# Patient Record
Sex: FEMALE | Race: WHITE | Employment: OTHER | URBAN - METROPOLITAN AREA
[De-identification: names, ages, dates, MRNs, and addresses within clinical notes are randomized per-mention and may not be internally consistent; named-entity substitution may affect disease eponyms.]

---

## 2019-03-01 ENCOUNTER — APPOINTMENT (OUTPATIENT)
Dept: GENERAL RADIOLOGY | Age: 84
End: 2019-03-01
Payer: MEDICARE

## 2019-03-01 ENCOUNTER — HOSPITAL ENCOUNTER (EMERGENCY)
Age: 84
Discharge: HOME | End: 2019-03-01
Payer: MEDICARE

## 2019-03-01 ENCOUNTER — APPOINTMENT (OUTPATIENT)
Dept: CT IMAGING | Age: 84
End: 2019-03-01
Payer: MEDICARE

## 2019-03-01 VITALS
HEART RATE: 92 BPM | SYSTOLIC BLOOD PRESSURE: 137 MMHG | BODY MASS INDEX: 25.13 KG/M2 | OXYGEN SATURATION: 95 % | DIASTOLIC BLOOD PRESSURE: 102 MMHG | WEIGHT: 128 LBS | TEMPERATURE: 97.8 F | RESPIRATION RATE: 16 BRPM | HEIGHT: 60 IN

## 2019-03-01 DIAGNOSIS — M54.50 ACUTE RIGHT-SIDED LOW BACK PAIN WITHOUT SCIATICA: ICD-10-CM

## 2019-03-01 DIAGNOSIS — S80.12XA CONTUSION OF LEFT LOWER LEG, INITIAL ENCOUNTER: ICD-10-CM

## 2019-03-01 DIAGNOSIS — S80.01XA CONTUSION OF RIGHT KNEE, INITIAL ENCOUNTER: ICD-10-CM
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
## 2019-03-01 PROCEDURE — 72131 CT LUMBAR SPINE W/O DYE: CPT

## 2019-03-01 PROCEDURE — 73564 X-RAY EXAM KNEE 4 OR MORE: CPT

## 2019-03-01 PROCEDURE — 99284 EMERGENCY DEPT VISIT MOD MDM: CPT

## 2019-03-01 PROCEDURE — 73590 X-RAY EXAM OF LOWER LEG: CPT

## 2019-03-01 PROCEDURE — 73560 X-RAY EXAM OF KNEE 1 OR 2: CPT

## 2019-03-01 RX ORDER — ATORVASTATIN CALCIUM 40 MG/1
40 TABLET, FILM COATED ORAL DAILY
COMMUNITY

## 2019-03-01 RX ORDER — PHENOL 1.4 %
1 AEROSOL, SPRAY (ML) MUCOUS MEMBRANE 2 TIMES DAILY
COMMUNITY

## 2019-03-01 RX ORDER — MAGNESIUM GLUCONATE 27 MG(500)
500 TABLET ORAL DAILY
COMMUNITY

## 2019-03-01 RX ORDER — ASPIRIN 81 MG/1
81 TABLET, CHEWABLE ORAL DAILY
COMMUNITY

## 2019-03-01 RX ORDER — OMEPRAZOLE AND SODIUM BICARBONATE 40; 1100 MG/1; MG/1
1 CAPSULE ORAL
Status: ON HOLD | COMMUNITY
End: 2020-05-08

## 2019-03-01 RX ORDER — ATENOLOL 50 MG/1
50 TABLET ORAL DAILY
COMMUNITY

## 2019-03-01 ASSESSMENT — PAIN SCALES - GENERAL: PAINLEVEL_OUTOF10: 5

## 2019-03-01 ASSESSMENT — ENCOUNTER SYMPTOMS
DIARRHEA: 0
SHORTNESS OF BREATH: 0
SORE THROAT: 0
NAUSEA: 0
VOMITING: 0
ABDOMINAL PAIN: 0
RHINORRHEA: 0
CONSTIPATION: 0
BLOOD IN STOOL: 0

## 2020-05-08 ENCOUNTER — APPOINTMENT (OUTPATIENT)
Dept: GENERAL RADIOLOGY | Age: 85
DRG: 291 | End: 2020-05-08
Payer: MEDICARE

## 2020-05-08 ENCOUNTER — HOSPITAL ENCOUNTER (INPATIENT)
Age: 85
LOS: 4 days | Discharge: HOME | DRG: 291 | End: 2020-05-12
Attending: EMERGENCY MEDICINE | Admitting: INTERNAL MEDICINE
Payer: MEDICARE

## 2020-05-08 PROBLEM — J96.00 ACUTE RESPIRATORY FAILURE (HCC): Status: ACTIVE | Noted: 2020-05-08

## 2020-05-08 LAB
ANION GAP SERPL CALCULATED.3IONS-SCNC: 14 MMOL/L (ref 3–16)
BACTERIA: ABNORMAL /HPF
BASE EXCESS VENOUS: 2.3 MMOL/L (ref -2–3)
BASOPHILS ABSOLUTE: 0.1 K/UL (ref 0–0.2)
BASOPHILS RELATIVE PERCENT: 0.9 %
BILIRUBIN URINE: NEGATIVE
BLOOD, URINE: ABNORMAL
BUN BLDV-MCNC: 11 MG/DL (ref 7–20)
CALCIUM SERPL-MCNC: 9.5 MG/DL (ref 8.3–10.6)
CARBOXYHEMOGLOBIN: 2.3 % (ref 0–1.5)
CHLORIDE BLD-SCNC: 95 MMOL/L (ref 99–110)
CLARITY: CLEAR
CO2: 24 MMOL/L (ref 21–32)
COLOR: YELLOW
CREAT SERPL-MCNC: 0.8 MG/DL (ref 0.6–1.2)
EKG ATRIAL RATE: 98 BPM
EKG DIAGNOSIS: NORMAL
EKG Q-T INTERVAL: 324 MS
EKG QRS DURATION: 88 MS
EKG QTC CALCULATION (BAZETT): 411 MS
EKG R AXIS: 52 DEGREES
EKG T AXIS: 32 DEGREES
EKG VENTRICULAR RATE: 97 BPM
EOSINOPHILS ABSOLUTE: 0 K/UL (ref 0–0.6)
EOSINOPHILS RELATIVE PERCENT: 0.2 %
GFR AFRICAN AMERICAN: >60
GFR NON-AFRICAN AMERICAN: >60
GLUCOSE BLD-MCNC: 111 MG/DL (ref 70–99)
GLUCOSE URINE: 500 MG/DL
HCO3 VENOUS: 26 MMOL/L (ref 24–28)
HCT VFR BLD CALC: 40.4 % (ref 36–48)
HEMOGLOBIN, VEN, REDUCED: 6.5 %
HEMOGLOBIN: 13.3 G/DL (ref 12–16)
KETONES, URINE: NEGATIVE MG/DL
LEUKOCYTE ESTERASE, URINE: ABNORMAL
LYMPHOCYTES ABSOLUTE: 1.6 K/UL (ref 1–5.1)
LYMPHOCYTES RELATIVE PERCENT: 13.1 %
MCH RBC QN AUTO: 29 PG (ref 26–34)
MCHC RBC AUTO-ENTMCNC: 33 G/DL (ref 31–36)
MCV RBC AUTO: 87.8 FL (ref 80–100)
METHEMOGLOBIN VENOUS: 0.6 % (ref 0–1.5)
MICROSCOPIC EXAMINATION: YES
MONOCYTES ABSOLUTE: 1.2 K/UL (ref 0–1.3)
MONOCYTES RELATIVE PERCENT: 9.8 %
NEUTROPHILS ABSOLUTE: 9.2 K/UL (ref 1.7–7.7)
NEUTROPHILS RELATIVE PERCENT: 76 %
NITRITE, URINE: NEGATIVE
O2 SAT, VEN: 93 %
PCO2, VEN: 38.9 MMHG (ref 41–51)
PDW BLD-RTO: 13.9 % (ref 12.4–15.4)
PH UA: 6 (ref 5–8)
PH VENOUS: 7.44 (ref 7.35–7.45)
PLATELET # BLD: 250 K/UL (ref 135–450)
PMV BLD AUTO: 7 FL (ref 5–10.5)
PO2, VEN: 72.5 MMHG (ref 25–40)
POTASSIUM REFLEX MAGNESIUM: 4.2 MMOL/L (ref 3.5–5.1)
PRO-BNP: 2210 PG/ML (ref 0–449)
PROCALCITONIN: 0.04 NG/ML (ref 0–0.15)
PROTEIN UA: NEGATIVE MG/DL
RBC # BLD: 4.61 M/UL (ref 4–5.2)
RBC UA: ABNORMAL /HPF (ref 0–4)
SODIUM BLD-SCNC: 133 MMOL/L (ref 136–145)
SPECIFIC GRAVITY UA: 1.01 (ref 1–1.03)
TCO2 CALC VENOUS: 27 MMOL/L
TROPONIN: <0.01 NG/ML
TROPONIN: <0.01 NG/ML
TSH REFLEX: 2.8 UIU/ML (ref 0.27–4.2)
URINE REFLEX TO CULTURE: YES
URINE TYPE: ABNORMAL
UROBILINOGEN, URINE: 0.2 E.U./DL
WBC # BLD: 12.1 K/UL (ref 4–11)
WBC UA: ABNORMAL /HPF (ref 0–5)

## 2020-05-08 PROCEDURE — 84145 PROCALCITONIN (PCT): CPT

## 2020-05-08 PROCEDURE — 2060000000 HC ICU INTERMEDIATE R&B

## 2020-05-08 PROCEDURE — 2700000000 HC OXYGEN THERAPY PER DAY

## 2020-05-08 PROCEDURE — 94640 AIRWAY INHALATION TREATMENT: CPT

## 2020-05-08 PROCEDURE — 87077 CULTURE AEROBIC IDENTIFY: CPT

## 2020-05-08 PROCEDURE — 94761 N-INVAS EAR/PLS OXIMETRY MLT: CPT

## 2020-05-08 PROCEDURE — 93005 ELECTROCARDIOGRAM TRACING: CPT | Performed by: EMERGENCY MEDICINE

## 2020-05-08 PROCEDURE — 81001 URINALYSIS AUTO W/SCOPE: CPT

## 2020-05-08 PROCEDURE — 87040 BLOOD CULTURE FOR BACTERIA: CPT

## 2020-05-08 PROCEDURE — 36415 COLL VENOUS BLD VENIPUNCTURE: CPT

## 2020-05-08 PROCEDURE — 87086 URINE CULTURE/COLONY COUNT: CPT

## 2020-05-08 PROCEDURE — U0003 INFECTIOUS AGENT DETECTION BY NUCLEIC ACID (DNA OR RNA); SEVERE ACUTE RESPIRATORY SYNDROME CORONAVIRUS 2 (SARS-COV-2) (CORONAVIRUS DISEASE [COVID-19]), AMPLIFIED PROBE TECHNIQUE, MAKING USE OF HIGH THROUGHPUT TECHNOLOGIES AS DESCRIBED BY CMS-2020-01-R: HCPCS

## 2020-05-08 PROCEDURE — 6370000000 HC RX 637 (ALT 250 FOR IP): Performed by: INTERNAL MEDICINE

## 2020-05-08 PROCEDURE — 84484 ASSAY OF TROPONIN QUANT: CPT

## 2020-05-08 PROCEDURE — 99285 EMERGENCY DEPT VISIT HI MDM: CPT

## 2020-05-08 PROCEDURE — 85025 COMPLETE CBC W/AUTO DIFF WBC: CPT

## 2020-05-08 PROCEDURE — 6360000002 HC RX W HCPCS: Performed by: EMERGENCY MEDICINE

## 2020-05-08 PROCEDURE — 84443 ASSAY THYROID STIM HORMONE: CPT

## 2020-05-08 PROCEDURE — 99223 1ST HOSP IP/OBS HIGH 75: CPT | Performed by: INTERNAL MEDICINE

## 2020-05-08 PROCEDURE — 80048 BASIC METABOLIC PNL TOTAL CA: CPT

## 2020-05-08 PROCEDURE — 6360000002 HC RX W HCPCS: Performed by: INTERNAL MEDICINE

## 2020-05-08 PROCEDURE — 87186 SC STD MICRODIL/AGAR DIL: CPT

## 2020-05-08 PROCEDURE — 96374 THER/PROPH/DIAG INJ IV PUSH: CPT

## 2020-05-08 PROCEDURE — 83880 ASSAY OF NATRIURETIC PEPTIDE: CPT

## 2020-05-08 PROCEDURE — 6370000000 HC RX 637 (ALT 250 FOR IP): Performed by: EMERGENCY MEDICINE

## 2020-05-08 PROCEDURE — 82803 BLOOD GASES ANY COMBINATION: CPT

## 2020-05-08 PROCEDURE — 2580000003 HC RX 258: Performed by: INTERNAL MEDICINE

## 2020-05-08 PROCEDURE — 87150 DNA/RNA AMPLIFIED PROBE: CPT

## 2020-05-08 PROCEDURE — 71045 X-RAY EXAM CHEST 1 VIEW: CPT

## 2020-05-08 RX ORDER — LOSARTAN POTASSIUM 100 MG/1
100 TABLET ORAL DAILY
COMMUNITY

## 2020-05-08 RX ORDER — METHYLPREDNISOLONE SODIUM SUCCINATE 125 MG/2ML
125 INJECTION, POWDER, LYOPHILIZED, FOR SOLUTION INTRAMUSCULAR; INTRAVENOUS ONCE
Status: COMPLETED | OUTPATIENT
Start: 2020-05-08 | End: 2020-05-08

## 2020-05-08 RX ORDER — IPRATROPIUM BROMIDE AND ALBUTEROL SULFATE 2.5; .5 MG/3ML; MG/3ML
1 SOLUTION RESPIRATORY (INHALATION)
Status: DISCONTINUED | OUTPATIENT
Start: 2020-05-08 | End: 2020-05-08

## 2020-05-08 RX ORDER — ACETAMINOPHEN 650 MG/1
650 SUPPOSITORY RECTAL EVERY 6 HOURS PRN
Status: DISCONTINUED | OUTPATIENT
Start: 2020-05-08 | End: 2020-05-12 | Stop reason: HOSPADM

## 2020-05-08 RX ORDER — CEFDINIR 300 MG/1
300 CAPSULE ORAL EVERY 12 HOURS SCHEDULED
Status: DISCONTINUED | OUTPATIENT
Start: 2020-05-08 | End: 2020-05-10

## 2020-05-08 RX ORDER — GUAIFENESIN 600 MG/1
600 TABLET, EXTENDED RELEASE ORAL 2 TIMES DAILY
COMMUNITY

## 2020-05-08 RX ORDER — BUDESONIDE AND FORMOTEROL FUMARATE DIHYDRATE 160; 4.5 UG/1; UG/1
2 AEROSOL RESPIRATORY (INHALATION) 2 TIMES DAILY
Status: DISCONTINUED | OUTPATIENT
Start: 2020-05-09 | End: 2020-05-12 | Stop reason: HOSPADM

## 2020-05-08 RX ORDER — ATORVASTATIN CALCIUM 40 MG/1
40 TABLET, FILM COATED ORAL DAILY
Status: DISCONTINUED | OUTPATIENT
Start: 2020-05-08 | End: 2020-05-12 | Stop reason: HOSPADM

## 2020-05-08 RX ORDER — POLYETHYLENE GLYCOL 3350 17 G/17G
17 POWDER, FOR SOLUTION ORAL DAILY PRN
Status: DISCONTINUED | OUTPATIENT
Start: 2020-05-08 | End: 2020-05-12 | Stop reason: HOSPADM

## 2020-05-08 RX ORDER — PREDNISONE 20 MG/1
40 TABLET ORAL DAILY
Status: DISCONTINUED | OUTPATIENT
Start: 2020-05-08 | End: 2020-05-12 | Stop reason: HOSPADM

## 2020-05-08 RX ORDER — ONDANSETRON 2 MG/ML
4 INJECTION INTRAMUSCULAR; INTRAVENOUS EVERY 6 HOURS PRN
Status: DISCONTINUED | OUTPATIENT
Start: 2020-05-08 | End: 2020-05-12 | Stop reason: HOSPADM

## 2020-05-08 RX ORDER — SODIUM CHLORIDE 0.9 % (FLUSH) 0.9 %
10 SYRINGE (ML) INJECTION PRN
Status: DISCONTINUED | OUTPATIENT
Start: 2020-05-08 | End: 2020-05-12 | Stop reason: HOSPADM

## 2020-05-08 RX ORDER — OMEPRAZOLE 20 MG/1
20 CAPSULE, DELAYED RELEASE ORAL DAILY
COMMUNITY

## 2020-05-08 RX ORDER — IPRATROPIUM BROMIDE AND ALBUTEROL SULFATE 2.5; .5 MG/3ML; MG/3ML
1 SOLUTION RESPIRATORY (INHALATION) ONCE
Status: COMPLETED | OUTPATIENT
Start: 2020-05-08 | End: 2020-05-08

## 2020-05-08 RX ORDER — ASPIRIN 81 MG/1
81 TABLET, CHEWABLE ORAL DAILY
Status: DISCONTINUED | OUTPATIENT
Start: 2020-05-08 | End: 2020-05-12 | Stop reason: HOSPADM

## 2020-05-08 RX ORDER — ATENOLOL 50 MG/1
50 TABLET ORAL DAILY
Status: DISCONTINUED | OUTPATIENT
Start: 2020-05-08 | End: 2020-05-12 | Stop reason: HOSPADM

## 2020-05-08 RX ORDER — FUROSEMIDE 10 MG/ML
40 INJECTION INTRAMUSCULAR; INTRAVENOUS DAILY
Status: DISCONTINUED | OUTPATIENT
Start: 2020-05-08 | End: 2020-05-09

## 2020-05-08 RX ORDER — AMLODIPINE BESYLATE 5 MG/1
5 TABLET ORAL DAILY
COMMUNITY

## 2020-05-08 RX ORDER — LOSARTAN POTASSIUM 100 MG/1
100 TABLET ORAL DAILY
Status: DISCONTINUED | OUTPATIENT
Start: 2020-05-08 | End: 2020-05-12 | Stop reason: HOSPADM

## 2020-05-08 RX ORDER — GUAIFENESIN 600 MG/1
600 TABLET, EXTENDED RELEASE ORAL 2 TIMES DAILY
Status: DISCONTINUED | OUTPATIENT
Start: 2020-05-08 | End: 2020-05-12 | Stop reason: HOSPADM

## 2020-05-08 RX ORDER — PANTOPRAZOLE SODIUM 40 MG/1
40 TABLET, DELAYED RELEASE ORAL
Status: DISCONTINUED | OUTPATIENT
Start: 2020-05-09 | End: 2020-05-12 | Stop reason: HOSPADM

## 2020-05-08 RX ORDER — ALBUTEROL SULFATE 2.5 MG/3ML
2.5 SOLUTION RESPIRATORY (INHALATION) ONCE
Status: COMPLETED | OUTPATIENT
Start: 2020-05-08 | End: 2020-05-08

## 2020-05-08 RX ORDER — PROMETHAZINE HYDROCHLORIDE 25 MG/1
12.5 TABLET ORAL EVERY 6 HOURS PRN
Status: DISCONTINUED | OUTPATIENT
Start: 2020-05-08 | End: 2020-05-12 | Stop reason: HOSPADM

## 2020-05-08 RX ORDER — CEFDINIR 300 MG/1
300 CAPSULE ORAL 2 TIMES DAILY
COMMUNITY
Start: 2020-05-07 | End: 2020-05-14

## 2020-05-08 RX ORDER — ALBUTEROL SULFATE 90 UG/1
2 AEROSOL, METERED RESPIRATORY (INHALATION) 4 TIMES DAILY
Status: DISCONTINUED | OUTPATIENT
Start: 2020-05-08 | End: 2020-05-12 | Stop reason: HOSPADM

## 2020-05-08 RX ORDER — SODIUM CHLORIDE 0.9 % (FLUSH) 0.9 %
10 SYRINGE (ML) INJECTION EVERY 12 HOURS SCHEDULED
Status: DISCONTINUED | OUTPATIENT
Start: 2020-05-08 | End: 2020-05-12 | Stop reason: HOSPADM

## 2020-05-08 RX ORDER — ACETAMINOPHEN 325 MG/1
650 TABLET ORAL EVERY 6 HOURS PRN
Status: DISCONTINUED | OUTPATIENT
Start: 2020-05-08 | End: 2020-05-12 | Stop reason: HOSPADM

## 2020-05-08 RX ORDER — LORATADINE 10 MG/1
10 TABLET ORAL DAILY
COMMUNITY

## 2020-05-08 RX ORDER — AMLODIPINE BESYLATE 5 MG/1
5 TABLET ORAL DAILY
Status: DISCONTINUED | OUTPATIENT
Start: 2020-05-08 | End: 2020-05-10

## 2020-05-08 RX ADMIN — AMLODIPINE BESYLATE 5 MG: 5 TABLET ORAL at 15:42

## 2020-05-08 RX ADMIN — METHYLPREDNISOLONE SODIUM SUCCINATE 125 MG: 125 INJECTION, POWDER, FOR SOLUTION INTRAMUSCULAR; INTRAVENOUS at 06:39

## 2020-05-08 RX ADMIN — ASPIRIN 81 MG 81 MG: 81 TABLET ORAL at 13:03

## 2020-05-08 RX ADMIN — FUROSEMIDE 40 MG: 10 INJECTION, SOLUTION INTRAMUSCULAR; INTRAVENOUS at 13:06

## 2020-05-08 RX ADMIN — Medication 10 ML: at 22:13

## 2020-05-08 RX ADMIN — PREDNISONE 40 MG: 20 TABLET ORAL at 13:03

## 2020-05-08 RX ADMIN — CEFDINIR 300 MG: 300 CAPSULE ORAL at 22:12

## 2020-05-08 RX ADMIN — ALBUTEROL SULFATE 2.5 MG: 2.5 SOLUTION RESPIRATORY (INHALATION) at 06:50

## 2020-05-08 RX ADMIN — ENOXAPARIN SODIUM 40 MG: 40 INJECTION SUBCUTANEOUS at 13:06

## 2020-05-08 RX ADMIN — ALBUTEROL SULFATE 2 PUFF: 108 AEROSOL, METERED RESPIRATORY (INHALATION) at 20:32

## 2020-05-08 RX ADMIN — BUDESONIDE AND FORMOTEROL FUMARATE DIHYDRATE 2 PUFF: 160; 4.5 AEROSOL RESPIRATORY (INHALATION) at 20:33

## 2020-05-08 RX ADMIN — Medication 10 ML: at 13:09

## 2020-05-08 RX ADMIN — LOSARTAN POTASSIUM 100 MG: 100 TABLET, FILM COATED ORAL at 13:03

## 2020-05-08 RX ADMIN — ATORVASTATIN CALCIUM 40 MG: 40 TABLET, FILM COATED ORAL at 15:42

## 2020-05-08 RX ADMIN — GUAIFENESIN 600 MG: 600 TABLET, EXTENDED RELEASE ORAL at 22:12

## 2020-05-08 RX ADMIN — ALBUTEROL SULFATE 2 PUFF: 108 AEROSOL, METERED RESPIRATORY (INHALATION) at 15:44

## 2020-05-08 RX ADMIN — IPRATROPIUM BROMIDE AND ALBUTEROL SULFATE 1 AMPULE: .5; 3 SOLUTION RESPIRATORY (INHALATION) at 06:44

## 2020-05-08 RX ADMIN — ATENOLOL 50 MG: 50 TABLET ORAL at 13:03

## 2020-05-08 RX ADMIN — CEFDINIR 300 MG: 300 CAPSULE ORAL at 13:25

## 2020-05-08 ASSESSMENT — PAIN SCALES - GENERAL
PAINLEVEL_OUTOF10: 0
PAINLEVEL_OUTOF10: 0

## 2020-05-08 NOTE — PROGRESS NOTES
RESPIRATORY THERAPY ASSESSMENT    Name:  Mildred Faust Record Number:  [id number]  Age: 80 y.o. Gender: female  : 8/15/1933  Today's Date:  2020  Room:  [facility]4313-01    Assessment     Is the patient being admitted for a COPD or Asthma exacerbation? No   (If yes the patient will be seen every 4 hours for the first 24 hours and then reassessed)    Patient Admission Diagnosis:\" SOB and COVID r/o      Allergies  Allergies   Allergen Reactions    Sulfamethoxazole-Trimethoprim Hives    Albuterol Sulfate      Tachy     Alendronate Hives    Codeine      Feels dizzy     Fluticasone     Morphine And Related     Nitrofurantoin      Can not have due to pulmonary fibrosis     Promethazine Other (See Comments)     Legs swelled    Sodium Chloride Swelling     inhalation only    Sulfa Antibiotics Hives    Vitamin D Analogs Rash       Pulmonary History:COPD  Home Oxygen Therapy:  room air  Home Respiratory Therapy:Albuterol and Vilanterol/Fluticasone Furoate   Current Respiratory Therapy:  Message sent to pharmacy to convert to COVID appropriate inhalers for home regmin  Treatment Type: HHN  Medications: Albuterol    Respiratory Severity Index(RSI)   Patients with orders for inhalation medications, oxygen, or any therapeutic treatment modality will be placed on Respiratory Protocol. They will be assessed with the first treatment and at least every 72 hours thereafter. The following severity scale will be used to determine frequency of treatment intervention. Smoking History: No Smoking History = 0    Social History  Social History     Tobacco Use    Smoking status: Never Smoker    Smokeless tobacco: Never Used   Substance Use Topics    Alcohol use: No    Drug use: No       Recent Surgical History: None = 0  Past Surgical History  History reviewed. No pertinent surgical history.     Level of Consciousness: Alert, Oriented, and Cooperative = 0    Level of Activity: Walking unassisted = 0    Respiratory Pattern: Regular Pattern; RR 8-20 = 0    Breath Sounds: Diminshed bilaterally and/or crackles = 2    Sputum   ,  ,    Cough: Strong, spontaneous, non-productive = 0    Vital Signs   /77   Pulse 102   Temp 99 °F (37.2 °C) (Oral)   Resp 20   Ht 5' (1.524 m)   Wt 129 lb 13.6 oz (58.9 kg)   SpO2 93%   BMI 25.36 kg/m²   SPO2 (COPD values may differ): 88-89% on room air or greater than 92% on FiO2 28- 35% = 2    Peak Flow (asthma only): not applicable = 0    RSI: 0-4 = See once and convert to home regimen or discontinue        Plan       Goals: Medication delivery    Patient/caregiver was educated on the proper method of use for Respiratory Care Devices:  Yes      Level of patient/caregiver understanding able to:   x Verbalize understanding   ? Demonstrate understanding       ? Teach back        ? Needs reinforcement       ? No available caregiver               ? Other:     Response to education:  Excellent     Is patient being placed on Home Treatment Regimen? Yes     Does the patient have everything they need prior to discharge? Yes     Comments: Chart reviewed    Plan of Care: Home regimen conversion. Electronically signed by Sury Saenz RCP on 5/8/2020 at 1:06 PM    Respiratory Protocol Guidelines     1. Assessment and treatment by Respiratory Therapy will be initiated for medication and therapeutic interventions upon initiation of aerosolized medication. 2. Physician will be contacted for respiratory rate (RR) greater than 35 breaths per minute. Therapy will be held for heart rate (HR) greater than 140 beats per minute, pending direction from physician. 3. Bronchodilators will be administered via Metered Dose Inhaler (MDI) with spacer when the following criteria are met:  a. Alert and cooperative     b. HR < 140 bpm  c. RR < 30 bpm                d. Can demonstrate a 2-3 second inspiratory hold  4.  Bronchodilators will be administered via Hand Held Nebulizer [facility]) to patients when ANY of the following criteria are met  a. Incognizant or uncooperative          b. Patients treated with HHN at Home        c. Unable to demonstrate proper use of MDI with spacer     d. RR > 30 bpm   5. Bronchodilators will be delivered via Metered Dose Inhaler (MDI), HHN, Aerogen to intubated patients on mechanical ventilation. 6. Inhalation medication orders will be delivered and/or substituted as outlined below. Aerosolized Medications Ordering and Administration Guidelines:    1. All Medications will be ordered by a physician, and their frequency and/or modality will be adjusted as defined by the patients Respiratory Severity Index (RSI) score. 2. If the patient does not have documented COPD, consider discontinuing anticholinergics when RSI is less than 9.  3. If the bronchospasm worsens (increased RSI), then the bronchodilator frequency can be increased to a maximum of every 4 hours. If greater than every 4 hours is required, the physician will be contacted. 4. If the bronchospasm improves, the frequency of the bronchodilator can be decreased, based on the patient's RSI, but not less than home treatment regimen frequency. 5. Bronchodilator(s) will be discontinued if patient has a RSI less than 9 and has received no scheduled or as needed treatment for 72  Hrs. Patients Ordered on a Mucolytic Agent:    1. Must always be administered with a bronchodilator. 2. Discontinue if patient experiences worsened bronchospasm, or secretions have lessened to the point that the patient is able to clear them with a cough. Anti-inflammatory and Combination Medications:    1. If the patient lacks prior history of lung disease, is not using inhaled anti-inflammatory medication at home, and lacks wheezing by examination or by history for at least 24 hours, contact physician for possible discontinuation.

## 2020-05-08 NOTE — PROGRESS NOTES
Attempted to call point person James, no answer on either of the phone numbers listed. Will pass to night shift.  Patient is in contact with multiple family members

## 2020-05-08 NOTE — ED PROVIDER NOTES
[address] [facility] ENCOUNTER          ATTENDING PHYSICIAN NOTE       Date of evaluation: 5/8/2020    ADDENDUM:      Care of this patient was assumed from Dr. Reesa Claude. The patient was seen for Shortness of Breath  . The patient's initial evaluation and plan have been discussed with the prior provider who initially evaluated the patient. Nursing Notes, Past Medical Hx, Past Surgical Hx, Social Hx, Allergies, and Family Hx were all reviewed. Diagnostic Results     EKG   Reviewed, demonstrates atrial fibrillation with ventricular rate of 97 bpm.  No acute ischemic changes. RADIOLOGY:  XR CHEST PORTABLE   Final Result   Impression: Probable mild interstitial pulmonary edema.           LABS:   Results for orders placed or performed during the hospital encounter of 05/08/20   CBC Auto Differential   Result Value Ref Range    WBC 12.1 (H) 4.0 - 11.0 K/uL    RBC 4.61 4.00 - 5.20 M/uL    Hemoglobin 13.3 12.0 - 16.0 g/dL    Hematocrit 40.4 36.0 - 48.0 %    MCV 87.8 80.0 - 100.0 fL    MCH 29.0 26.0 - 34.0 pg    MCHC 33.0 31.0 - 36.0 g/dL    RDW 13.9 12.4 - 15.4 %    Platelets 946 516 - 179 K/uL    MPV 7.0 5.0 - 10.5 fL    Neutrophils % 76.0 %    Lymphocytes % 13.1 %    Monocytes % 9.8 %    Eosinophils % 0.2 %    Basophils % 0.9 %    Neutrophils Absolute 9.2 (H) 1.7 - 7.7 K/uL    Lymphocytes Absolute 1.6 1.0 - 5.1 K/uL    Monocytes Absolute 1.2 0.0 - 1.3 K/uL    Eosinophils Absolute 0.0 0.0 - 0.6 K/uL    Basophils Absolute 0.1 0.0 - 0.2 K/uL   Basic Metabolic Panel w/ Reflex to MG   Result Value Ref Range    Sodium 133 (L) 136 - 145 mmol/L    Potassium reflex Magnesium 4.2 3.5 - 5.1 mmol/L    Chloride 95 (L) 99 - 110 mmol/L    CO2 24 21 - 32 mmol/L    Anion Gap 14 3 - 16    Glucose 111 (H) 70 - 99 mg/dL    BUN 11 7 - 20 mg/dL    CREATININE 0.8 0.6 - 1.2 mg/dL    GFR Non-African American >60 >60    GFR African American >60 >60    Calcium 9.5 8.3 - 10.6 mg/dL   Troponin   Result Value Ref Range

## 2020-05-08 NOTE — CARE COORDINATION
Case Management Assessment           Initial Evaluation                Date / Time of Evaluation: 5/8/2020 3:56 PM                 Assessment Completed by: Maira Merino    Patient Name: Terry Morgan     YOB: 1933  Diagnosis: Acute respiratory failure ([facility] [address] [J96.00]  Acute respiratory failure ([facility] [address] [J96.00]     Date / Time: 5/8/2020  6:18 AM    Patient Admission Status: Inpatient    If patient is discharged prior to next notation, then this note serves as note for discharge by case management. Current PCP: [address] Patient: No    Chart Reviewed: Yes  Patient/ Family Interviewed: Yes    Initial assessment completed at bedside with: pt over the phone    Hospitalization in the last 30 days: No    Emergency Contacts:  Extended Emergency Contact Information  Primary Emergency Contact: Debora Balderas Phone: [phone]  Work Phone: [phone]  Relation: Child    Advance Directives:   Code Status: Full Code      Financial  Payor: Jonh Favor / Plan: [place] / Product Type: *No Product type* /     Pre-cert required for SNF: Yes    Pharmacy  No Pharmacies Listed    Potential assistance Purchasing Medications:    Does Patient want to participate in local refill/ meds to beds program?:      Meds To Beds General Rules:  1. Can ONLY be done Monday- Friday between 8:30am-5pm  2. Prescription(s) must be in pharmacy by 3pm to be filled same day  3. Copy of patient's insurance/ prescription drug card and patient face sheet must be sent along with the prescription(s)  4. Cost of Rx cannot be added to hospital bill. If financial assistance is needed, please contact unit  or ;  or  CANNOT provide pharmacy voucher for patients co-pays  5.  Patients can then  the prescription on their way out of the hospital at discharge, or pharmacy can deliver to the bedside if staff is available. (payment due at time of pick-up or delivery - cash, check, or card accepted)     Able to afford home medications/ co-pay costs: Yes    ADLS  Support Systems:      PT AM-PAC:   /24  OT AM-PAC:   /24    [place]: from home with son  Steps:     Plans to RETURN to current housing: Yes  Barriers to RETURNING to current housing: none    Home Care Information  Currently ACTIVE with [address]: No  Home Care Agency: Not Applicable          Durable Medical Equipment  DME Provider: n/a  Equipment: n/a    Home Oxygen and Respiratory Equipment  Has HOME OXYGEN prior to admission: No  [address]: Not Applicable  Other Respiratory Equipment: nebulizer        Dialysis  Active with HD/PD prior to admission: No  Nephrologist:     HD Center:  Not Applicable    DISCHARGE PLAN:  Disposition: Home- No Services Needed    Transportation PLAN for discharge: family     Factors facilitating achievement of predicted outcomes: Family support    Barriers to discharge: Medical complications    Additional Case Management Notes:   CM spoke with patient over the phone. Patient is from home with her son, independent pta, no DME needs, does own shopping, cleaning and ADL's. Patient denies any needs for HHC at this time. Family available to transport home at discharge. The Plan for Transition of Care is related to the following treatment goals of Acute respiratory failure ([address] [J96.00]  Acute respiratory failure ([address] [J96.00]    The Patient and/or patient representative Claudette and her family were provided with a choice of provider and agrees with the discharge plan Yes    Freedom of choice list was provided with basic dialogue that supports the patient's individualized plan of care/goals and shares the quality data associated with the providers.  Yes    Care Transition patient: No    Britany Moore RN  [facility]  Case Management Department  Ph: [phone]   Fax: [phone]

## 2020-05-08 NOTE — ED NOTES
Covid swab collected/double bagged/walked to lab by [facility], [facility]/per protocol.       Johnny Valiente RN  05/08/20 [id number]

## 2020-05-08 NOTE — ED PROVIDER NOTES
[address] [facility] [address] ENCOUNTER          ATTENDING PHYSICIAN NOTE       Date of evaluation: 5/8/2020    Chief Complaint     Shortness of Breath      History of Present Illness     Claudette Dona Brunt is a 80 y.o. female the past medical history of COPD history of pneumonia in the past who presents with shortness of breath over the past 2 to 3 weeks but then progressively shortness of breath over the past 3 days. Has been using DuoNeb inhaler, was on a prednisone burst was on cefdinir for UTI as well. Has been speaking with pulmonology pulmonologist but then things got worse last night so she presents here. EMS state that she was having oxygen saturations in the mid 80s on their arrival on room air they placed her on oxygen it went up to the mid 90s. Here she endorses shortness of breath, feels like she has to cough something up but is speaking in full sentences. She does endorse fever with temperatures at home. Review of Systems     Review of Systems  A full 10 point review of systems was obtained. Pertinent positives and negatives as documented in the HPI, otherwise all other systems were reviewed and were negative. Past Medical, Surgical, Family, and Social History     She has a past medical history of COPD (chronic obstructive pulmonary disease) ([facility] [address] and PNA (pneumonia). She has no past surgical history on file. Her family history is not on file. She reports that she has never smoked. She has never used smokeless tobacco. She reports that she does not drink alcohol or use drugs.     Medications     Previous Medications    AMLODIPINE-ATORVASTATIN PO    Take by mouth    ASPIRIN 81 MG CHEWABLE TABLET    Take 81 mg by mouth daily    ATENOLOL (TENORMIN) 50 MG TABLET    Take 50 mg by mouth daily    ATORVASTATIN (LIPITOR) 40 MG TABLET    Take 40 mg by mouth daily    CALCIUM CARBONATE 600 MG TABS TABLET    Take 1 tablet by mouth daily as needed    FEXOFENADINE HCL (MUCINEX ALLERGY PO)    Take by mouth    FLUTICASONE FUROATE-VILANTEROL (BREO ELLIPTA IN)    Inhale into the lungs    MAGNESIUM GLUCONATE (MAGONATE) 500 MG TABLET    Take 500 mg by mouth 2 times daily    OMEPRAZOLE-SODIUM BICARBONATE (OMEPPI)  MG PER CAPSULE    Take 1 capsule by mouth every morning (before breakfast)       Allergies     She is allergic to sulfamethoxazole-trimethoprim; albuterol sulfate; alendronate; codeine; fluticasone; morphine and related; nitrofurantoin; promethazine; sodium chloride; sulfa antibiotics; and vitamin d analogs. Physical Exam     INITIAL VITALS: BP: (!) 140/80, Temp: 99.1 °F (37.3 °C), Pulse: 100, Resp: 20, SpO2: 100 %   Physical Exam  General: Well appearing in NAD  HEENT:  head is atraumatic, sclera are clear, oropharynx is nonerythematous, mucus membranes are moist  Neck: Trachea midline  Chest: Nonlabored respirations, decreased breath sounds diffusely  Cardiovascular: Regular rate and rhythm, 2+ radial pulses bilaterally  Abdominal: Nondistended abdomen, soft, nontender without rebound or gaurding  Skin: Warm, dry well perfused, no rashes  Extremities: no obvious deformities, no tenderness to palpation diffusely  Neurologic:  Alert and oriented, speech is clear and intact without dysarthria, gait is intact  Psychologic: appropriate mood and affect  Diagnostic Results     EKG   Ventricular rate 97, atrial fibrillation with no significant ST or T wave changes and no signs of ischemia    RADIOLOGY:  XR CHEST PORTABLE    (Results Pending)       LABS:   Results for orders placed or performed during the hospital encounter of 05/08/20   EKG 12 Lead   Result Value Ref Range    Ventricular Rate 97 BPM    Atrial Rate 98 BPM    QRS Duration 88 ms    Q-T Interval 324 ms    QTc Calculation (Bazett) 411 ms    R Axis 52 degrees    T Axis 32 degrees    Diagnosis       EKG performed in ER and to be interpreted by ER physician. Confirmed by MD, ER ([id number]),  Christophe Quintero ([id number]) on 5/8/2020 6:31:11 AM       ED BEDSIDE ULTRASOUND:      RECENT VITALS:  BP: (!) 140/80,Temp: 99.1 °F (37.3 °C), Pulse: 100, Resp: 20, SpO2: 100 %     Procedures       ED Course     Nursing Notes, Past Medical Hx, Past Surgical Hx, Social Hx,Allergies, and Family Hx were reviewed. patient was given the following medications:  Orders Placed This Encounter   Medications    methylPREDNISolone sodium (SOLU-MEDROL) injection 125 mg    ipratropium-albuterol (DUONEB) nebulizer solution 1 ampule    albuterol (PROVENTIL) nebulizer solution 2.5 mg    albuterol (PROVENTIL) nebulizer solution 2.5 mg       CONSULTS:  None    MEDICAL DECISIONMAKING / ASSESSMENT / Jayne Delores is a 80 y.o. female presents today with shortness of breath requiring 3 L of oxygen with diffuse diminished breath sounds throughout all lung fields. She is speaking in full sentences. No overt signs of fluid overload. EKG shows appears to be atrial fibrillation. Concern for CHF versus COPD exacerbation. And decreased breath sounds will start with albuterol inhalers and Solu-Medrol. At the same time will obtain CBC, basic metabolic panel, troponin, bnp and CXR. At this point patient is stable and will be signed out to oncoming colleague.       Neelima Alva MD  05/08/20 1975

## 2020-05-08 NOTE — H&P
HOSPITALISTS HISTORY AND PHYSICAL    5/8/2020 12:08 PM    Patient Information:  Krystal Mars is a 80 y.o. female [id number]  PCP:  Elaine Leggett (Tel: None )    Chief complaint:    Chief Complaint   Patient presents with    Shortness of Breath       History of Present Illness:  Kervin Baxter is a 80 y.o. female who presented with worsening shortness of breath going on for the last 3 weeks. Mentions that she is having cough with yellowish phlegm which is her baseline. Denies any fevers or chills. Last night she was short of breath to the point that she could not lie flat and decided to come to the ER after calling her daughter. She does not use her oxygen at baseline. She was quite hypoxic in the ER and still needing 3 L of oxygen. She was given steroids and breathing treatment with good response in the ER. The x-ray was read as congestive heart failure. She does mention history of CHF. She did had a stress test done at [facility] which was negative. She denies any orthopnea or PND but does mention having exertional dyspnea    H/o heart failure in feb, started 2 years ago,     Does not smoke,   Heart attack 25 years, follows up with Dr. Clif Qiu. Quit smoking at that time    Dr Bob Lam is her pulmonary doctor, she was given prednisone without much response about a week ago. Lives with her son   History obtained from patient and going over the chart  Old medical records show she was put on [address] by the urologist for UTI      REVIEW OF SYSTEMS:   All other ROS negative except mentioned in [address]    Past Medical History:   has a past medical history of COPD (chronic obstructive pulmonary disease) ([address] and PNA (pneumonia). Past Surgical History:   has no past surgical history on file.      Medications:  No current facility-administered medications on file prior to encounter. Current Outpatient Medications on File Prior to Encounter   Medication Sig Dispense Refill    omeprazole (PRILOSEC) 20 MG delayed release capsule Take 20 mg by mouth daily      loratadine (CLARITIN) 10 MG tablet Take 10 mg by mouth daily      guaiFENesin (MUCINEX) 600 MG extended release tablet Take 600 mg by mouth 2 times daily      losartan (COZAAR) 100 MG tablet Take 100 mg by mouth daily      cefdinir (OMNICEF) 300 MG capsule Take 300 mg by mouth 2 times daily      aspirin 81 MG chewable tablet Take 81 mg by mouth daily      atenolol (TENORMIN) 50 MG tablet Take 50 mg by mouth daily      atorvastatin (LIPITOR) 40 MG tablet Take 40 mg by mouth daily      magnesium gluconate (MAGONATE) 500 MG tablet Take 500 mg by mouth daily       calcium carbonate 600 MG TABS tablet Take 1 tablet by mouth 2 times daily       AMLODIPINE-ATORVASTATIN PO Take 5 mg by mouth       Fluticasone Furoate-Vilanterol (BREO ELLIPTA IN) Inhale into the lungs         Allergies: Allergies   Allergen Reactions    Sulfamethoxazole-Trimethoprim Hives    Albuterol Sulfate      Tachy     Alendronate Hives    Codeine      Feels dizzy     Fluticasone     Morphine And Related     Nitrofurantoin      Can not have due to pulmonary fibrosis     Promethazine Other (See Comments)     Legs swelled    Sodium Chloride Swelling     inhalation only    Sulfa Antibiotics Hives    Vitamin D Analogs Rash        Social History:  Patient Lives with her son,   reports that she has never smoked. She has never used smokeless tobacco. She reports that she does not drink alcohol or use drugs.      Family History:  family history is not on file. ,daughter had breast cancer     Physical Exam:  /77   Pulse 102   Temp 99 °F (37.2 °C) (Oral)   Resp 20   Ht 5' (1.524 m)   Wt 129 lb 13.6 oz (58.9 kg)   SpO2 93%   BMI 25.36 kg/m²   Basal crackles and right-sided crackles up to the middle of the lung  No pedal edema, no murmur  On 3 L of oxygen  General appearance:  Appears comfortable. Well nourished  Eyes: Sclera clear, pupils equal  ENT: Moist mucus membranes, no thrush. Trachea midline. Cardiovascular: Regular rhythm, normal S1, S2. No murmur, gallop, rub. No edema in lower extremities  Respiratory: Clear to auscultation bilaterally, no wheeze, good inspiratory effort  Gastrointestinal: Abdomen soft, non-tender, not distended, normal bowel sounds  Musculoskeletal: No cyanosis in digits, neck supple  Neurology: Cranial nerves grossly intact. Alert and oriented in time, place and person. No speech or motor deficits  Psychiatry: Appropriate affect. Not agitated  Skin: Warm, dry, normal turgor, no rash  Brisk capillary refill, peripheral pulses palpable   Labs:  CBC:   Lab Results   Component Value Date    WBC 12.1 05/08/2020    RBC 4.61 05/08/2020    HGB 13.3 05/08/2020    HCT 40.4 05/08/2020    MCV 87.8 05/08/2020    MCH 29.0 05/08/2020    MCHC 33.0 05/08/2020    RDW 13.9 05/08/2020     05/08/2020    MPV 7.0 05/08/2020     BMP:    Lab Results   Component Value Date     05/08/2020    K 4.2 05/08/2020    CL 95 05/08/2020    CO2 24 05/08/2020    BUN 11 05/08/2020    CREATININE 0.8 05/08/2020    CALCIUM 9.5 05/08/2020    GFRAA >60 05/08/2020    LABGLOM >60 05/08/2020    GLUCOSE 111 05/08/2020     XR CHEST PORTABLE   Final Result   Impression: Probable mild interstitial pulmonary edema. Chest Xray:   EKG:    I visualized CXR images and EKG strips pac, with sinus rhythm to me    Discussed case  with er provider over the phone     Problem List  Active Problems:    Acute respiratory failure ([address]  Resolved Problems:    * No resolved hospital problems. *        Assessment/Plan:   Acute hypoxic respiratory failure  Etiology probably combination of CHF and COPD  To my exam not wheezing much but has been given high-dose of steroids in the ER. She does has crackles more on the right side.   Lasix trial, prednisone 40 mg daily, echocardiogram, cardiology input, trend her troponin  Rule out COVID-19      History of UTIs  Continue with Omnicef, get UA    History of bronchiectasis  Follows up with Dr. Rafaela Austin, last visit in March 10      DVT prophylaxis Lovenox  Code status full code  Diet cardiac  IV access peripheral   Madsen Catheter no    Admit as inpatient. I anticipate hospitalization spanning more than two midnights for investigation and treatment of the above medically necessary diagnoses. Please note that some part of this chart was generated using Dragon dictation software. Although every effort was made to ensure the accuracy of this automated transcription, some errors in transcription may have occurred inadvertently. If you may need any clarification, please do not hesitate to contact me through [facility].        Barbie Elder MD    5/8/2020 12:08 PM

## 2020-05-08 NOTE — CONSULTS
[facility]    Cardiology Consult/H&P  Consulting Cardiologist Elmo Santacruz M.D., [facility]  Referring Provider:  Beth Schilder [facility]    5/8/2020, 3:20 PM    Chief Complaint   Patient presents with    Shortness of Breath      Primary Cardiologist:  Asked by Danielle Norton MD/FLACO ANN to evaluate and assess this patient's congestive heart failure atrial fibrillation CAD    History of Present Illness:   Kervin Baxter is a 80 y.o. female is admitted with acute respiratory failure shortness of breath and she is in COVID-19 rule out. She has for the past 24 to 36 hours increased shortness of breath and dyspnea. Her lungs were congested on x-ray and she had minimal peripheral edema. She is in atrial fibrillation and apparently this is been chronic for her. From her admission anticoagulation has been recommended but she is adamant about not taking any. She does have a history of CAD and apparently 25 years ago had an acute MI requiring balloon angioplasty but no stents at that time. She denies any chest pains or angina or any anginal equivalents. CAD with previous balloon angioplasties without stents 25 years ago  Hypertension  Hyperlipidemia  Atrial fibrillation paroxysmal and declines to take anticoagulation  COPD and sees Dr. Barbra Magana  Past Medical History:   has a past medical history of COPD (chronic obstructive pulmonary disease) ([address] and PNA (pneumonia). Surgical History:   has no past surgical history on file. Social History:   reports that she has never smoked. She has never used smokeless tobacco. She reports that she does not drink alcohol or use drugs. Family History:  family history is not on file. Home Medications:  Prior to Admission medications    Medication Sig Start Date End Date Taking?  Authorizing Provider   omeprazole (PRILOSEC) 20 MG delayed release capsule Take 20 mg by mouth daily   Yes Historical Provider, MD   loratadine (CLARITIN) 10 MG tablet Take 10 mg by mouth daily   Yes Historical Provider, MD   guaiFENesin (MUCINEX) 600 MG extended release tablet Take 600 mg by mouth 2 times daily   Yes Historical Provider, MD   losartan (COZAAR) 100 MG tablet Take 100 mg by mouth daily   Yes Historical Provider, MD   cefdinir (OMNICEF) 300 MG capsule Take 300 mg by mouth 2 times daily 5/7/20 5/14/20 Yes Historical Provider, MD   amLODIPine (NORVASC) 5 MG tablet Take 5 mg by mouth daily   Yes Historical Provider, MD   aspirin 81 MG chewable tablet Take 81 mg by mouth daily   Yes Historical Provider, MD   atenolol (TENORMIN) 50 MG tablet Take 50 mg by mouth daily   Yes Historical Provider, MD   atorvastatin (LIPITOR) 40 MG tablet Take 40 mg by mouth daily   Yes Historical Provider, MD   magnesium gluconate (MAGONATE) 500 MG tablet Take 500 mg by mouth daily    Yes Historical Provider, MD   calcium carbonate 600 MG TABS tablet Take 1 tablet by mouth 2 times daily    Yes Historical Provider, MD   Fluticasone Furoate-Vilanterol (BREO ELLIPTA IN) Inhale into the lungs   Yes Historical Provider, MD        Current Medications:  Current Facility-Administered Medications   Medication Dose Route Frequency Provider Last Rate Last Dose    sodium chloride flush 0.9 % injection 10 mL  10 mL Intravenous 2 times per day Niyah Briones MD   10 mL at 05/08/20 1309    sodium chloride flush 0.9 % injection 10 mL  10 mL Intravenous PRN Niyah Briones MD        acetaminophen (TYLENOL) tablet 650 mg  650 mg Oral Q6H PRN Niyah Briones MD        Or    acetaminophen (TYLENOL) suppository 650 mg  650 mg Rectal Q6H PRN Niyah Briones MD        polyethylene glycol (GLYCOLAX) packet 17 g  17 g Oral Daily PRN Niyah Briones MD        promethazine (PHENERGAN) tablet 12.5 mg  12.5 mg Oral Q6H PRN Niyah Briones MD        Or    ondansetron [facility]) injection 4 mg  4 mg Intravenous Q6H PRN Niyah Matthew Moreira MD        enoxaparin (LOVENOX) injection 40 mg  40 mg Subcutaneous Daily Niyah Moreira MD   40 mg at 05/08/20 1306    perflutren lipid microspheres (DEFINITY) injection 1.65 mg  1.5 mL Intravenous ONCE PRN Niyah Moreira MD        cefdinir (OMNICEF) capsule 300 mg  300 mg Oral 2 times per day Niyah Moreira MD   300 mg at 05/08/20 1325    amLODIPine (NORVASC) tablet 5 mg  5 mg Oral Daily Niyah Moreira MD        aspirin chewable tablet 81 mg  81 mg Oral Daily Niyah Moreira MD   81 mg at 05/08/20 1303    atenolol (TENORMIN) tablet 50 mg  50 mg Oral Daily Niyah Moreira MD   50 mg at 05/08/20 1303    atorvastatin (LIPITOR) tablet 40 mg  40 mg Oral Daily Niyah Moreira MD        [facility]) extended release tablet 600 mg  600 mg Oral BID Niyah Moreira MD        losartan (COZAAR) tablet 100 mg  100 mg Oral Daily Niyah Moreira MD   100 mg at 05/08/20 1303    [START ON 5/9/2020] pantoprazole (PROTONIX) tablet 40 mg  40 mg Oral QAM AC Niyah Moreira MD        furosemide (LASIX) injection 40 mg  40 mg Intravenous Daily Niyah Moreira MD   40 mg at 05/08/20 1306    predniSONE (DELTASONE) tablet 40 mg  40 mg Oral Daily Niyah Moreira MD   40 mg at 05/08/20 1303    [START ON 5/9/2020] budesonide-formoterol (SYMBICORT) 160-4.5 MCG/ACT inhaler 2 puff  2 puff Inhalation BID Niyah Moreira MD        albuterol sulfate  (90 Base) MCG/ACT inhaler 2 puff  2 puff Inhalation 4x daily Niyah Moreira MD           Allergies:  Sulfamethoxazole-trimethoprim; Albuterol sulfate; Alendronate; Codeine; Fluticasone; Morphine and related; Nitrofurantoin; Promethazine; Sodium chloride; Sulfa antibiotics; and Vitamin d analogs     Review of Systems:   · Constitutional: there has been no unanticipated weight loss. · Eyes: No visual changes or diplopia. No scleral icterus.   · ENT: No Headaches, hearing loss or vertigo. No mouth sores or sore throat. · Cardiovascular: Reviewed in HPI  ·  Pulmonary:  no cough or sputum production. · Gastrointestinal: No abdominal pain, appetite loss, blood in stools. No change in bowel or bladder habits. · Genitourinary: No dysuria, trouble voiding, or hematuria. · Musculoskeletal:  No gait disturbance, weakness or joint complaints. · Integumentary: No rash or pruritis. Endocrine: No malaise, fatigue or temperature intolerance. Hematologic/Lymphatic: No abnormal bruising or bleeding, blood clots or swollen lymph nodes. · Allergic/Immunologic: No nasal congestion or hives. · All other ROS are reviewed and are unremarkable. Physical Examination:    Vitals:    05/08/20 0930 05/08/20 0940 05/08/20 1031 05/08/20 1058   BP: 114/64  111/77    Pulse: 92 116 102    Resp: 16 17 20    Temp:   99 °F (37.2 °C)    TempSrc:   Oral    SpO2: 97% 94% 93%    Weight:    129 lb 13.6 oz (58.9 kg)   Height:    5' (1.524 m)        EXAMl and General Appearance:  Healthy. And alert . Elderly but not frail  HEENT: eyes and ears intact. No nasal masses  THYROID: not enlarged  LUNGS:  · Clear to auscultation and percussion  HEART and VASCULAR:  · The apical impulses not displaced  · Heart tones are crisp and normal  · Cervical veins are not engorged  · The carotid upstroke is normal in amplitude and contour without delay or bruit  · Peripheral pulses are symmetrical and full  · There is no clubbing, cyanosis of the extremities. · No peripheral edema  · Femoral Arteries: 2+ and equal  · Pedal Pulses:2+ and equal   ABDOMEN[de-identified]  · No masses or tenderness  · Liver/Spleen: No Abnormalities Noted  NEUROLOGICAL:  . Moves all extremities to command. Cranial nerves 2-12 are in tact.   PSYCHIATRIC: alert and lucid  and oriented and appropriate  SKIN: No lesions or rashes  LYMPH NODES: none enlarged    ·   ·   ·     CBC with Differential:    Lab Results   Component Value Date    WBC 12.1 05/08/2020 RBC 4.61 05/08/2020    HGB 13.3 05/08/2020    HCT 40.4 05/08/2020     05/08/2020    MCV 87.8 05/08/2020    MCH 29.0 05/08/2020    MCHC 33.0 05/08/2020    RDW 13.9 05/08/2020    LYMPHOPCT 13.1 05/08/2020    MONOPCT 9.8 05/08/2020    BASOPCT 0.9 05/08/2020    MONOSABS 1.2 05/08/2020    LYMPHSABS 1.6 05/08/2020    EOSABS 0.0 05/08/2020    BASOSABS 0.1 05/08/2020     BMP:    Lab Results   Component Value Date     05/08/2020    K 4.2 05/08/2020    CL 95 05/08/2020    CO2 24 05/08/2020    BUN 11 05/08/2020    CREATININE 0.8 05/08/2020    CALCIUM 9.5 05/08/2020    GFRAA >60 05/08/2020    LABGLOM >60 05/08/2020     Uric Acid:  No components found for: URIC  PT/INR:  No results found for: PROTIME, INR  Last 3 Troponin:    Lab Results   Component Value Date    TROPONINI <0.01 05/08/2020     FLP:  No results found for: TRIG, HDL, LDLCALC, LDLDIRECT, LABVLDL    EKG: Atrial fibrillation 75/min. Nonspecific ST and T wave changes  echocardiogramStudy Conclusions January 28, 2020    - Left ventricle: There is mild focal basal hypertrophy. There is    hypertrophy of the septum. Systolic function was normal. The    estimated ejection fraction was in the range of 59% to 63%. - Stress ECG conclusions: There were no stress arrhythmias or    conduction abnormalities. The stress ECG was negative for    ischemia. Z[place] scoring: exercise time of 3min; maximum ST    deviation of 0mm; no angina; resulting score is 3. This score    predicts a moderate risk of cardiac events. - Stress echo: Normal echo stress. Impressions:   Normal study after maximal exercise. The target  heart rate of 114bpm was achieved. Assessment/ Plan     Patient Active Problem List    Diagnosis Date Noted    Acute respiratory failure ([facility] [address] 05/08/2020   Currently acute respiratory failure seemingly related to some degree of diastolic heart dysfunction.   Her last echo stress test in January 2028 showed normal ejection fraction and negative for coronary

## 2020-05-08 NOTE — ED NOTES
Blood Culture #2 drawn from right ac at 0934    Site cleaned with chlorhexidine for 30 seconds and allowed to dry before cultures drawn.        Angel Disla RN  05/08/20 2977

## 2020-05-08 NOTE — ED TRIAGE NOTES
Pt presents to ED with trouble breathing that started last night approx 2330 last night.  Pt on non rebreather upon arrival

## 2020-05-09 PROBLEM — I50.33 ACUTE ON CHRONIC DIASTOLIC CONGESTIVE HEART FAILURE (HCC): Status: ACTIVE | Noted: 2020-05-09

## 2020-05-09 PROBLEM — I48.20 CHRONIC A-FIB (HCC): Status: ACTIVE | Noted: 2020-05-09

## 2020-05-09 LAB
A/G RATIO: 1.9 (ref 1.1–2.2)
ALBUMIN SERPL-MCNC: 4.1 G/DL (ref 3.4–5)
ALP BLD-CCNC: 62 U/L (ref 40–129)
ALT SERPL-CCNC: 28 U/L (ref 10–40)
ANION GAP SERPL CALCULATED.3IONS-SCNC: 15 MMOL/L (ref 3–16)
AST SERPL-CCNC: 15 U/L (ref 15–37)
BILIRUB SERPL-MCNC: 0.7 MG/DL (ref 0–1)
BUN BLDV-MCNC: 18 MG/DL (ref 7–20)
CALCIUM SERPL-MCNC: 9.3 MG/DL (ref 8.3–10.6)
CHLORIDE BLD-SCNC: 93 MMOL/L (ref 99–110)
CO2: 23 MMOL/L (ref 21–32)
CREAT SERPL-MCNC: 0.8 MG/DL (ref 0.6–1.2)
GFR AFRICAN AMERICAN: >60
GFR NON-AFRICAN AMERICAN: >60
GLOBULIN: 2.2 G/DL
GLUCOSE BLD-MCNC: 176 MG/DL (ref 70–99)
HCT VFR BLD CALC: 37 % (ref 36–48)
HEMOGLOBIN: 12.9 G/DL (ref 12–16)
MCH RBC QN AUTO: 30 PG (ref 26–34)
MCHC RBC AUTO-ENTMCNC: 34.8 G/DL (ref 31–36)
MCV RBC AUTO: 86 FL (ref 80–100)
PDW BLD-RTO: 13.7 % (ref 12.4–15.4)
PLATELET # BLD: 240 K/UL (ref 135–450)
PMV BLD AUTO: 6.7 FL (ref 5–10.5)
POTASSIUM REFLEX MAGNESIUM: 3.8 MMOL/L (ref 3.5–5.1)
RBC # BLD: 4.3 M/UL (ref 4–5.2)
REPORT: NORMAL
SODIUM BLD-SCNC: 131 MMOL/L (ref 136–145)
TOTAL PROTEIN: 6.3 G/DL (ref 6.4–8.2)
WBC # BLD: 14 K/UL (ref 4–11)

## 2020-05-09 PROCEDURE — 94640 AIRWAY INHALATION TREATMENT: CPT

## 2020-05-09 PROCEDURE — 2060000000 HC ICU INTERMEDIATE R&B

## 2020-05-09 PROCEDURE — 36415 COLL VENOUS BLD VENIPUNCTURE: CPT

## 2020-05-09 PROCEDURE — 85027 COMPLETE CBC AUTOMATED: CPT

## 2020-05-09 PROCEDURE — 2580000003 HC RX 258: Performed by: INTERNAL MEDICINE

## 2020-05-09 PROCEDURE — 6360000002 HC RX W HCPCS: Performed by: INTERNAL MEDICINE

## 2020-05-09 PROCEDURE — 94761 N-INVAS EAR/PLS OXIMETRY MLT: CPT

## 2020-05-09 PROCEDURE — 2700000000 HC OXYGEN THERAPY PER DAY

## 2020-05-09 PROCEDURE — 99233 SBSQ HOSP IP/OBS HIGH 50: CPT | Performed by: INTERNAL MEDICINE

## 2020-05-09 PROCEDURE — 80053 COMPREHEN METABOLIC PANEL: CPT

## 2020-05-09 PROCEDURE — 6370000000 HC RX 637 (ALT 250 FOR IP): Performed by: INTERNAL MEDICINE

## 2020-05-09 RX ORDER — FUROSEMIDE 10 MG/ML
40 INJECTION INTRAMUSCULAR; INTRAVENOUS 2 TIMES DAILY
Status: DISCONTINUED | OUTPATIENT
Start: 2020-05-09 | End: 2020-05-10

## 2020-05-09 RX ADMIN — GUAIFENESIN 600 MG: 600 TABLET, EXTENDED RELEASE ORAL at 20:07

## 2020-05-09 RX ADMIN — Medication 10 ML: at 08:04

## 2020-05-09 RX ADMIN — ALBUTEROL SULFATE 2 PUFF: 108 AEROSOL, METERED RESPIRATORY (INHALATION) at 11:41

## 2020-05-09 RX ADMIN — ALBUTEROL SULFATE 2 PUFF: 108 AEROSOL, METERED RESPIRATORY (INHALATION) at 16:06

## 2020-05-09 RX ADMIN — PREDNISONE 40 MG: 20 TABLET ORAL at 08:00

## 2020-05-09 RX ADMIN — FUROSEMIDE 40 MG: 10 INJECTION, SOLUTION INTRAMUSCULAR; INTRAVENOUS at 17:15

## 2020-05-09 RX ADMIN — AMLODIPINE BESYLATE 5 MG: 5 TABLET ORAL at 08:00

## 2020-05-09 RX ADMIN — Medication 10 ML: at 20:08

## 2020-05-09 RX ADMIN — CEFDINIR 300 MG: 300 CAPSULE ORAL at 20:07

## 2020-05-09 RX ADMIN — BUDESONIDE AND FORMOTEROL FUMARATE DIHYDRATE 2 PUFF: 160; 4.5 AEROSOL RESPIRATORY (INHALATION) at 21:06

## 2020-05-09 RX ADMIN — CEFDINIR 300 MG: 300 CAPSULE ORAL at 10:56

## 2020-05-09 RX ADMIN — ATENOLOL 50 MG: 50 TABLET ORAL at 08:00

## 2020-05-09 RX ADMIN — ALBUTEROL SULFATE 2 PUFF: 108 AEROSOL, METERED RESPIRATORY (INHALATION) at 21:06

## 2020-05-09 RX ADMIN — ATORVASTATIN CALCIUM 40 MG: 40 TABLET, FILM COATED ORAL at 08:00

## 2020-05-09 RX ADMIN — GUAIFENESIN 600 MG: 600 TABLET, EXTENDED RELEASE ORAL at 08:00

## 2020-05-09 RX ADMIN — FUROSEMIDE 40 MG: 10 INJECTION, SOLUTION INTRAMUSCULAR; INTRAVENOUS at 08:00

## 2020-05-09 RX ADMIN — LOSARTAN POTASSIUM 100 MG: 100 TABLET, FILM COATED ORAL at 08:00

## 2020-05-09 RX ADMIN — VANCOMYCIN HYDROCHLORIDE 1000 MG: 10 INJECTION, POWDER, LYOPHILIZED, FOR SOLUTION INTRAVENOUS at 15:25

## 2020-05-09 RX ADMIN — ALBUTEROL SULFATE 2 PUFF: 108 AEROSOL, METERED RESPIRATORY (INHALATION) at 08:10

## 2020-05-09 RX ADMIN — BUDESONIDE AND FORMOTEROL FUMARATE DIHYDRATE 2 PUFF: 160; 4.5 AEROSOL RESPIRATORY (INHALATION) at 08:10

## 2020-05-09 RX ADMIN — ENOXAPARIN SODIUM 40 MG: 40 INJECTION SUBCUTANEOUS at 08:00

## 2020-05-09 RX ADMIN — PANTOPRAZOLE SODIUM 40 MG: 40 TABLET, DELAYED RELEASE ORAL at 05:54

## 2020-05-09 ASSESSMENT — PAIN SCALES - GENERAL: PAINLEVEL_OUTOF10: 0

## 2020-05-09 NOTE — PROGRESS NOTES
[facility] Daily Progress Note      Admit Date:  5/8/2020    Subjective:  Ms. Rufina Fitzgerald follow up for resp failure/CHF/afib. Chart reviewed. COVID being ruled out. Objective:   /76   Pulse 84   Temp 97 °F (36.1 °C) (Oral)   Resp 16   Ht 5' (1.524 m)   Wt 128 lb 4.9 oz (58.2 kg)   SpO2 94%   BMI 25.06 kg/m²       Intake/Output Summary (Last 24 hours) at 5/9/2020 1400  Last data filed at 5/8/2020 2215  Gross per 24 hour   Intake 120 ml   Output 2300 ml   Net -2180 ml       TELEMETRY: Atrial fibrillation         Medications:    sodium chloride flush  10 mL Intravenous 2 times per day    enoxaparin  40 mg Subcutaneous Daily    cefdinir  300 mg Oral 2 times per day    amLODIPine  5 mg Oral Daily    aspirin  81 mg Oral Daily    atenolol  50 mg Oral Daily    atorvastatin  40 mg Oral Daily    guaiFENesin  600 mg Oral BID    losartan  100 mg Oral Daily    pantoprazole  40 mg Oral QAM AC    furosemide  40 mg Intravenous Daily    predniSONE  40 mg Oral Daily    budesonide-formoterol  2 puff Inhalation BID    albuterol sulfate HFA  2 puff Inhalation 4x daily       sodium chloride flush, acetaminophen **OR** acetaminophen, polyethylene glycol, promethazine **OR** ondansetron, perflutren lipid microspheres    Lab Data:  CBC:   Recent Labs     05/08/20  0650 05/09/20  0601   WBC 12.1* 14.0*   HGB 13.3 12.9   HCT 40.4 37.0   MCV 87.8 86.0    240     BMP:   Recent Labs     05/08/20  0650 05/09/20  0601   * 131*   K 4.2 3.8   CL 95* 93*   CO2 24 23   BUN 11 18   CREATININE 0.8 0.8     LIVER PROFILE:   Recent Labs     05/09/20  0601   AST 15   ALT 28   BILITOT 0.7   ALKPHOS 62     PT/INR: No results for input(s): PROTIME, INR in the last 72 hours. APTT: No results for input(s): APTT in the last 72 hours. BNP:  No results for input(s): BNP in the last 72 hours.   IMAGING:       Due to the current efforts to prevent transmission of COVID-19 and also the need to preserve PPE for other caregivers, a face-to-face encounter with the patient was not performed. That being said, all relevant records and diagnostic tests were reviewed, including laboratory results and imaging. Please reference any relevant documentation elsewhere. Care will be coordinated with the primary service. Assessment:  Patient Active Problem List    Diagnosis Date Noted    Acute respiratory failure ([facility] [address] 05/08/2020     Imp:  Resp failure/CHF/afib    Plan:  1. Covid pending. Had good diuresis. Continue IV lasix. Watch Cr. Wean O2 as tolerated. HR controlled in afib. Pt refuses AC. Pulmonary toilet.        Core Measures:  · Discharge instructions:   · LVEF documented:   · ACEI for LV dysfunction:   · Smoking Cessation:    Donell Cuba MD 5/9/2020 7:22 AM

## 2020-05-09 NOTE — CONSULTS
Clinical Pharmacy Consult Note    Admit date: 5/8/2020    Subjective/Objective:  79 yo female with PMH significant for CHF, COPD, and pneumonia presented to [facility] ED with worsening SOB over the past 3 weeks. Of note, she was also recently started on cefdinir for treatment of a UTI. She was admitted for a COPD and CHF exacerbation and rule-out of COVID-19. She was also continued on her course of cefdinir. Today 1 out of 2 blood cultures is growing coagulase negative staphylococcus, and patient is now being started on vancomycin. Pharmacy is consulted to dose Vancomycin per Dr. Minh Iqbal. Pertinent Medications:  Cefdinir 300 mg PO twice daily -- day #3 (5/7-current)  Vancomycin 1 g IV q24h -- day #1    PERTINENT LABS:  Recent Labs     05/08/20  0650 05/09/20  0601   * 131*   K 4.2 3.8   CL 95* 93*   CO2 24 23   BUN 11 18   CREATININE 0.8 0.8       Estimated Creatinine Clearance: 40 mL/min (based on SCr of 0.8 mg/dL). Recent Labs     05/08/20  0650 05/09/20  0601   WBC 12.1* 14.0*   HGB 13.3 12.9   HCT 40.4 37.0   MCV 87.8 86.0    240       Height:  5' (152.4 cm)  Weight: 128 lb 4.9 oz (58.2 kg)    Micro:  Date Site Micro Susceptibility   5/8 Blood culture x 2 #1: NGTD  #2: CONS    5/8 COVID-19 In process    5/8 Urine culture In process        Assessment/Plan:  1. Possible CONS bacteremia:  vancomycin day #1  Vancomycin  · Will start vancomycin 1 g IV q24h. This provides ~ 17 mg/kg and is based on a half-life elimination of ~18 hours. · Plan to obtain a trough once vancomycin has reached steady state on this regimen, likely prior to the 4th dose. · Blood culture growing CONS in 1 out of 2 bottles is likely indicative of a contaminant. Provider notified. Plan to d/c vancomycin tomorrow if 2nd blood culture remains negative. · Renal function will be monitored closely and dosing will be adjusted as appropriate.     2. UTI: Cefidinir day #3  · Continue cefdinir 300 mg twice daily per outpatient

## 2020-05-09 NOTE — PROGRESS NOTES
[facility] PROGRESS NOTE    5/9/2020 2:38 PM        Name: Beltran Oconnor . Admitted: 5/8/2020  Primary Care Provider: Loyda Escobar (Tel: None)    Brief Course:        CC: Shortness of breath    Subjective:  .     Patient feels better denies any new complaints wants to know about the COVID-19 results 1 out of 2 blood culture bottles came positive for coagulation negative staph    Reviewed interval ancillary notes    Current Medications  furosemide (LASIX) injection 40 mg, BID  vancomycin (VANCOCIN) 1,000 mg in dextrose 5 % 250 mL IVPB, Q24H  sodium chloride flush 0.9 % injection 10 mL, 2 times per day  sodium chloride flush 0.9 % injection 10 mL, PRN  acetaminophen (TYLENOL) tablet 650 mg, Q6H PRN    Or  acetaminophen (TYLENOL) suppository 650 mg, Q6H PRN  polyethylene glycol (GLYCOLAX) packet 17 g, Daily PRN  promethazine (PHENERGAN) tablet 12.5 mg, Q6H PRN    Or  ondansetron (ZOFRAN) injection 4 mg, Q6H PRN  enoxaparin (LOVENOX) injection 40 mg, Daily  perflutren lipid microspheres (DEFINITY) injection 1.65 mg, ONCE PRN  cefdinir (OMNICEF) capsule 300 mg, 2 times per day  amLODIPine (NORVASC) tablet 5 mg, Daily  aspirin chewable tablet 81 mg, Daily  atenolol (TENORMIN) tablet 50 mg, Daily  atorvastatin (LIPITOR) tablet 40 mg, Daily  guaiFENesin (MUCINEX) extended release tablet 600 mg, BID  losartan (COZAAR) tablet 100 mg, Daily  pantoprazole (PROTONIX) tablet 40 mg, QAM AC  predniSONE (DELTASONE) tablet 40 mg, Daily  budesonide-formoterol (SYMBICORT) 160-4.5 MCG/ACT inhaler 2 puff, BID  albuterol sulfate  (90 Base) MCG/ACT inhaler 2 puff, 4x daily        Objective:  BP 98/72   Pulse 84   Temp 97.1 °F (36.2 °C)   Resp 16   Ht 5' (1.524 m)   Wt 128 lb 4.9 oz (58.2 kg)   SpO2 96%   BMI 25.06 kg/m²     Intake/Output Summary (Last 24 hours) at 5/9/2020 1438  Last data filed at 5/9/2020 1412  Gross per 24 hour   Intake 480 ml   Output 2600 ml   Net -2120 ml      Wt Readings from Last 3 Encounters:   05/09/20 128 lb 4.9 oz (58.2 kg)   03/01/19 128 lb (58.1 kg)     No crackles appreciated as compared to yesterday  General appearance:  Appears comfortable  Eyes: Sclera clear. Pupils equal.  ENT: Moist oral mucosa. Trachea midline, no adenopathy. Cardiovascular: Regular rhythm, normal S1, S2. No murmur. No edema in lower extremities  Respiratory: Not using accessory muscles. Good inspiratory effort. Clear to auscultation bilaterally, no wheeze or crackles. GI: Abdomen soft, no tenderness, not distended, normal bowel sounds  Musculoskeletal: No cyanosis in digits, neck supple  Neurology: CN 2-12 grossly intact. No speech or motor deficits  Psych: Normal affect. Alert and oriented in time, place and person  Skin: Warm, dry, normal turgor  Extremity exam shows brisk capillary refill. Peripheral pulses are palpable in lower extremities     Labs and Tests:  CBC:   Recent Labs     05/08/20  0650 05/09/20  0601   WBC 12.1* 14.0*   HGB 13.3 12.9    240     BMP:    Recent Labs     05/08/20  0650 05/09/20  0601   * 131*   K 4.2 3.8   CL 95* 93*   CO2 24 23   BUN 11 18   CREATININE 0.8 0.8   GLUCOSE 111* 176*     Hepatic:   Recent Labs     05/09/20  0601   AST 15   ALT 28   BILITOT 0.7   ALKPHOS 62     XR CHEST PORTABLE   Final Result   Impression: Probable mild interstitial pulmonary edema. Problem List  Active Problems:    Acute respiratory failure (HCC)    Acute on chronic diastolic congestive heart failure (HCC)    Chronic a-fib  Resolved Problems:    * No resolved hospital problems.  *       Assessment & Plan:   Acute hypoxic respiratory failure  Etiology probably combination of CHF and COPD  Crackles improved as compared to yesterday  prednisone 40 mg daily, echocardiogram, cardiology input, trend her troponin  Rule out COVID-19    1 out of 2 blood cultures positive for coag negative staph  Elevated leukocytosis, covered with bank for 24 hours hopefully it is a contaminant, if cultures from other blood are negative will discontinue vancomycin    Hyponatremia  Monitor    Afib  Declined anticoagulation, cardiology input appreciated     History of UTIs  Continue with Omnicef, UA abnormal urine cultures pending     History of bronchiectasis  Follows up with Dr. Harriet Kenney, last visit in March 10       IV Access: Peripheral  Madsen: No  Diet: DIET CARDIAC;  Code:Full Code  DVT PPX Lovenox  Disposition 24 to 48 hours probably back to home      Ben Jauregui MD   5/9/2020 2:38 PM

## 2020-05-09 NOTE — PLAN OF CARE
O2 sat >95% while on 1L o2 nc. Patient reports some shortness of breath when walking around room.  Tejada Gess RN

## 2020-05-10 LAB
ANION GAP SERPL CALCULATED.3IONS-SCNC: 15 MMOL/L (ref 3–16)
BUN BLDV-MCNC: 24 MG/DL (ref 7–20)
CALCIUM SERPL-MCNC: 9.4 MG/DL (ref 8.3–10.6)
CHLORIDE BLD-SCNC: 93 MMOL/L (ref 99–110)
CO2: 27 MMOL/L (ref 21–32)
CREAT SERPL-MCNC: 0.8 MG/DL (ref 0.6–1.2)
GFR AFRICAN AMERICAN: >60
GFR NON-AFRICAN AMERICAN: >60
GLUCOSE BLD-MCNC: 158 MG/DL (ref 70–99)
HCT VFR BLD CALC: 37.1 % (ref 36–48)
HEMOGLOBIN: 12.5 G/DL (ref 12–16)
MAGNESIUM: 1.7 MG/DL (ref 1.8–2.4)
MCH RBC QN AUTO: 29 PG (ref 26–34)
MCHC RBC AUTO-ENTMCNC: 33.5 G/DL (ref 31–36)
MCV RBC AUTO: 86.6 FL (ref 80–100)
ORGANISM: ABNORMAL
PDW BLD-RTO: 13.9 % (ref 12.4–15.4)
PLATELET # BLD: 266 K/UL (ref 135–450)
PMV BLD AUTO: 6.7 FL (ref 5–10.5)
POTASSIUM REFLEX MAGNESIUM: 3.5 MMOL/L (ref 3.5–5.1)
RBC # BLD: 4.29 M/UL (ref 4–5.2)
SODIUM BLD-SCNC: 135 MMOL/L (ref 136–145)
URINE CULTURE, ROUTINE: ABNORMAL
WBC # BLD: 17.1 K/UL (ref 4–11)

## 2020-05-10 PROCEDURE — 2580000003 HC RX 258: Performed by: INTERNAL MEDICINE

## 2020-05-10 PROCEDURE — 6360000002 HC RX W HCPCS: Performed by: INTERNAL MEDICINE

## 2020-05-10 PROCEDURE — 94640 AIRWAY INHALATION TREATMENT: CPT

## 2020-05-10 PROCEDURE — 2060000000 HC ICU INTERMEDIATE R&B

## 2020-05-10 PROCEDURE — 80048 BASIC METABOLIC PNL TOTAL CA: CPT

## 2020-05-10 PROCEDURE — 85027 COMPLETE CBC AUTOMATED: CPT

## 2020-05-10 PROCEDURE — 6370000000 HC RX 637 (ALT 250 FOR IP): Performed by: INTERNAL MEDICINE

## 2020-05-10 PROCEDURE — 83735 ASSAY OF MAGNESIUM: CPT

## 2020-05-10 PROCEDURE — 99232 SBSQ HOSP IP/OBS MODERATE 35: CPT | Performed by: INTERNAL MEDICINE

## 2020-05-10 RX ORDER — MAGNESIUM SULFATE 1 G/100ML
1 INJECTION INTRAVENOUS ONCE
Status: COMPLETED | OUTPATIENT
Start: 2020-05-10 | End: 2020-05-10

## 2020-05-10 RX ORDER — FUROSEMIDE 40 MG/1
40 TABLET ORAL DAILY
Status: DISCONTINUED | OUTPATIENT
Start: 2020-05-11 | End: 2020-05-12 | Stop reason: HOSPADM

## 2020-05-10 RX ADMIN — ATENOLOL 50 MG: 50 TABLET ORAL at 08:09

## 2020-05-10 RX ADMIN — ALBUTEROL SULFATE 2 PUFF: 108 AEROSOL, METERED RESPIRATORY (INHALATION) at 21:05

## 2020-05-10 RX ADMIN — Medication 10 ML: at 08:11

## 2020-05-10 RX ADMIN — MEROPENEM 1 G: 1 INJECTION, POWDER, FOR SOLUTION INTRAVENOUS at 22:14

## 2020-05-10 RX ADMIN — GUAIFENESIN 600 MG: 600 TABLET, EXTENDED RELEASE ORAL at 08:09

## 2020-05-10 RX ADMIN — ALBUTEROL SULFATE 2 PUFF: 108 AEROSOL, METERED RESPIRATORY (INHALATION) at 07:56

## 2020-05-10 RX ADMIN — Medication 10 ML: at 22:15

## 2020-05-10 RX ADMIN — PANTOPRAZOLE SODIUM 40 MG: 40 TABLET, DELAYED RELEASE ORAL at 08:09

## 2020-05-10 RX ADMIN — ENOXAPARIN SODIUM 40 MG: 40 INJECTION SUBCUTANEOUS at 08:10

## 2020-05-10 RX ADMIN — PREDNISONE 40 MG: 20 TABLET ORAL at 08:09

## 2020-05-10 RX ADMIN — BUDESONIDE AND FORMOTEROL FUMARATE DIHYDRATE 2 PUFF: 160; 4.5 AEROSOL RESPIRATORY (INHALATION) at 21:06

## 2020-05-10 RX ADMIN — BUDESONIDE AND FORMOTEROL FUMARATE DIHYDRATE 2 PUFF: 160; 4.5 AEROSOL RESPIRATORY (INHALATION) at 07:56

## 2020-05-10 RX ADMIN — ATORVASTATIN CALCIUM 40 MG: 40 TABLET, FILM COATED ORAL at 08:09

## 2020-05-10 RX ADMIN — GUAIFENESIN 600 MG: 600 TABLET, EXTENDED RELEASE ORAL at 22:13

## 2020-05-10 RX ADMIN — MEROPENEM 1 G: 1 INJECTION, POWDER, FOR SOLUTION INTRAVENOUS at 08:54

## 2020-05-10 RX ADMIN — ALBUTEROL SULFATE 2 PUFF: 108 AEROSOL, METERED RESPIRATORY (INHALATION) at 11:38

## 2020-05-10 RX ADMIN — MAGNESIUM SULFATE HEPTAHYDRATE 1 G: 1 INJECTION, SOLUTION INTRAVENOUS at 10:11

## 2020-05-10 RX ADMIN — ALBUTEROL SULFATE 2 PUFF: 108 AEROSOL, METERED RESPIRATORY (INHALATION) at 15:29

## 2020-05-10 ASSESSMENT — PAIN SCALES - GENERAL
PAINLEVEL_OUTOF10: 0
PAINLEVEL_OUTOF10: 0

## 2020-05-10 NOTE — PROGRESS NOTES
Clinical Pharmacy Progress Note    Admit date: 5/8/2020    Interval Update: Urine culture grew ESBL Klebsiella oxytoca. Subjective/Objective:  79 yo female with PMH significant for CHF, COPD, and pneumonia presented to [place] [address] ED with worsening SOB over the past 3 weeks. Of note, she was also recently started on cefdinir for treatment of a UTI. She was admitted for a COPD and CHF exacerbation and rule-out of COVID-19. She was also continued on her course of cefdinir. Today 1 out of 2 blood cultures is growing coagulase negative staphylococcus, and patient is now being started on vancomycin. Pharmacy is consulted to dose Vancomycin per Dr. Savi Maynard. Pertinent Medications:  Cefdinir 300 mg PO twice daily (continued from outpatient)  Vancomycin 1 g IV q24h -- day #2    PERTINENT LABS:  Recent Labs     05/09/20  0601 05/10/20  0510   * 135*   K 3.8 3.5   CL 93* 93*   CO2 23 27   BUN 18 24*   CREATININE 0.8 0.8       Estimated Creatinine Clearance: 40 mL/min (based on SCr of 0.8 mg/dL). Recent Labs     05/09/20  0601 05/10/20  0510   WBC 14.0* 17.1*   HGB 12.9 12.5   HCT 37.0 37.1   MCV 86.0 86.6    266       Height:  5' (152.4 cm)  Weight: 127 lb (57.6 kg)    Micro:  Date Site Micro Susceptibility   5/8 Blood culture x 2 #1: NGTD  #2: CONS    5/8 COVID-19 In process    5/8 Urine culture ESBL Klebsiella oxytoca Sensitive only to ciprofloxacin, levofloxacin, imipenem, and nitrofurantoin       Assessment/Plan:  1. Possible CONS bacteremia:  vancomycin day #2  Vancomycin  · Currently receiving vancomycin 1 g IV q24h. · Renal function is stable. Will continue current dose at this time. · Plan to obtain a trough once vancomycin has reached steady state on this regimen, likely prior to the 4th dose. · Blood culture growing CONS in 1 out of 2 bottles is likely indicative of a contaminant. Recommend to discontinue vancomycin.   · Renal function will be monitored closely and dosing will be adjusted as

## 2020-05-10 NOTE — PROGRESS NOTES
[address] Daily Progress Note      Admit Date:  5/8/2020    Subjective:  Ms. Gerardo Brar follow up for resp failure/CHF/afib. Chart reviewed. COVID still pending. Per notes breathing better this am.    Objective:   BP (!) 92/52   Pulse 76   Temp 97.2 °F (36.2 °C) (Oral)   Resp 16   Ht 5' (1.524 m)   Wt 128 lb 4.9 oz (58.2 kg)   SpO2 94%   BMI 25.06 kg/m²       Intake/Output Summary (Last 24 hours) at 5/10/2020 5105  Last data filed at 5/10/2020 0326  Gross per 24 hour   Intake 840 ml   Output 1900 ml   Net -1060 ml       TELEMETRY: Atrial fibrillation         Medications:    furosemide  40 mg Intravenous BID    vancomycin  1,000 mg Intravenous Q24H    sodium chloride flush  10 mL Intravenous 2 times per day    enoxaparin  40 mg Subcutaneous Daily    cefdinir  300 mg Oral 2 times per day    amLODIPine  5 mg Oral Daily    aspirin  81 mg Oral Daily    atenolol  50 mg Oral Daily    atorvastatin  40 mg Oral Daily    guaiFENesin  600 mg Oral BID    losartan  100 mg Oral Daily    pantoprazole  40 mg Oral QAM AC    predniSONE  40 mg Oral Daily    budesonide-formoterol  2 puff Inhalation BID    albuterol sulfate HFA  2 puff Inhalation 4x daily       sodium chloride flush, acetaminophen **OR** acetaminophen, polyethylene glycol, promethazine **OR** ondansetron, perflutren lipid microspheres    Lab Data:  CBC:   Recent Labs     05/08/20  0650 05/09/20  0601 05/10/20  0510   WBC 12.1* 14.0* 17.1*   HGB 13.3 12.9 12.5   HCT 40.4 37.0 37.1   MCV 87.8 86.0 86.6    240 266     BMP:   Recent Labs     05/08/20  0650 05/09/20  0601 05/10/20  0510   * 131* 135*   K 4.2 3.8 3.5   CL 95* 93* 93*   CO2 24 23 27   BUN 11 18 24*   CREATININE 0.8 0.8 0.8     LIVER PROFILE:   Recent Labs     05/09/20  0601   AST 15   ALT 28   BILITOT 0.7   ALKPHOS 62     PT/INR: No results for input(s): PROTIME, INR in the last 72 hours. APTT: No results for input(s): APTT in the last 72 hours.   BNP:  No results for input(s): BNP in the last 72 hours. IMAGING:       Due to the current efforts to prevent transmission of COVID-19 and also the need to preserve PPE for other caregivers, a face-to-face encounter with the patient was not performed. That being said, all relevant records and diagnostic tests were reviewed, including laboratory results and imaging. Please reference any relevant documentation elsewhere. Care will be coordinated with the primary service. Assessment:  Patient Active Problem List    Diagnosis Date Noted    Acute on chronic diastolic congestive heart failure ([facility] 05/09/2020    Chronic a-fib 05/09/2020    Acute respiratory failure ([facility] 05/08/2020     Imp:  Resp failure/CHF/afib    Plan:  1. Covid still pending. Again reasonable diuresis. Continue IV lasix. Cr stable. Wean O2 as tolerated. HR controlled in afib. No AC per pt. Afebrile. Pulmonary toilet.        Core Measures:  · Discharge instructions:   · LVEF documented:   · ACEI for LV dysfunction:   · Smoking Cessation:    Aaliyah Walker MD 5/10/2020 6:32 AM

## 2020-05-10 NOTE — PROGRESS NOTES
[facility] PROGRESS NOTE    5/10/2020 1:57 PM        Name: Diane Severe . Admitted: 5/8/2020  Primary Care Provider: Sin Diaz (Tel: None)    Brief Course:        CC: Shortness of breath    Subjective:  .     Patient denies any new complaints, low blood pressure, reasonable urine output, no chest pain  Reviewed interval ancillary notes    Current Medications  meropenem (MERREM) 1 g in sodium chloride 0.9 % 100 mL IVPB (mini-bag), Q12H  [START ON 5/11/2020] furosemide (LASIX) tablet 40 mg, Daily  sodium chloride flush 0.9 % injection 10 mL, 2 times per day  sodium chloride flush 0.9 % injection 10 mL, PRN  acetaminophen (TYLENOL) tablet 650 mg, Q6H PRN    Or  acetaminophen (TYLENOL) suppository 650 mg, Q6H PRN  polyethylene glycol (GLYCOLAX) packet 17 g, Daily PRN  promethazine (PHENERGAN) tablet 12.5 mg, Q6H PRN    Or  ondansetron (ZOFRAN) injection 4 mg, Q6H PRN  enoxaparin (LOVENOX) injection 40 mg, Daily  perflutren lipid microspheres (DEFINITY) injection 1.65 mg, ONCE PRN  aspirin chewable tablet 81 mg, Daily  atenolol (TENORMIN) tablet 50 mg, Daily  atorvastatin (LIPITOR) tablet 40 mg, Daily  guaiFENesin (MUCINEX) extended release tablet 600 mg, BID  [Held by provider] losartan (COZAAR) tablet 100 mg, Daily  pantoprazole (PROTONIX) tablet 40 mg, QAM AC  predniSONE (DELTASONE) tablet 40 mg, Daily  budesonide-formoterol (SYMBICORT) 160-4.5 MCG/ACT inhaler 2 puff, BID  albuterol sulfate  (90 Base) MCG/ACT inhaler 2 puff, 4x daily        Objective:  /65   Pulse 66   Temp 97.3 °F (36.3 °C)   Resp 16   Ht 5' (1.524 m)   Wt 127 lb (57.6 kg)   SpO2 95%   BMI 24.80 kg/m²     Intake/Output Summary (Last 24 hours) at 5/10/2020 1357  Last data filed at 5/10/2020 1000  Gross per 24 hour   Intake 720 ml   Output 1200 ml   Net -480 ml      Wt Readings from Last 3 Encounters:   05/10/20 127 lb (57.6 kg)   03/01/19 128 lb (58.1 kg)   No crackles  General appearance:  Appears comfortable  Eyes: Sclera clear. Pupils equal.  ENT: Moist oral mucosa. Trachea midline, no adenopathy. Cardiovascular: Regular rhythm, normal S1, S2. No murmur. No edema in lower extremities  Respiratory: Not using accessory muscles. Good inspiratory effort. Clear to auscultation bilaterally, no wheeze or crackles. GI: Abdomen soft, no tenderness, not distended, normal bowel sounds  Musculoskeletal: No cyanosis in digits, neck supple  Neurology: CN 2-12 grossly intact. No speech or motor deficits  Psych: Normal affect. Alert and oriented in time, place and person  Skin: Warm, dry, normal turgor  Extremity exam shows brisk capillary refill. Peripheral pulses are palpable in lower extremities     Labs and Tests:  CBC:   Recent Labs     05/08/20  0650 05/09/20  0601 05/10/20  0510   WBC 12.1* 14.0* 17.1*   HGB 13.3 12.9 12.5    240 266     BMP:    Recent Labs     05/08/20  0650 05/09/20  0601 05/10/20  0510   * 131* 135*   K 4.2 3.8 3.5   CL 95* 93* 93*   CO2 24 23 27   BUN 11 18 24*   CREATININE 0.8 0.8 0.8   GLUCOSE 111* 176* 158*     Hepatic:   Recent Labs     05/09/20  0601   AST 15   ALT 28   BILITOT 0.7   ALKPHOS 62     XR CHEST PORTABLE   Final Result   Impression: Probable mild interstitial pulmonary edema. Problem List  Active Problems:    Acute respiratory failure (HCC)    Acute on chronic diastolic congestive heart failure (HCC)    Chronic a-fib  Resolved Problems:    * No resolved hospital problems.  *       Assessment & Plan:   Acute hypoxic respiratory failure  Etiology probably combination of CHF and COPD    prednisone 40 mg daily for total 5 days, echocardiogram, cardiology input,   Rule out COVID-19  Drop in blood pressure, hold Lasix today, discontinue Norvasc, hold losartan, patient appears euvolemic.    1 out of 2 blood cultures positive for coag negative staph  Contamination, stop

## 2020-05-11 LAB
ANION GAP SERPL CALCULATED.3IONS-SCNC: 13 MMOL/L (ref 3–16)
BUN BLDV-MCNC: 19 MG/DL (ref 7–20)
CALCIUM SERPL-MCNC: 9.4 MG/DL (ref 8.3–10.6)
CHLORIDE BLD-SCNC: 97 MMOL/L (ref 99–110)
CO2: 27 MMOL/L (ref 21–32)
CREAT SERPL-MCNC: 0.8 MG/DL (ref 0.6–1.2)
CULTURE, BLOOD 2: ABNORMAL
CULTURE, BLOOD 2: ABNORMAL
GFR AFRICAN AMERICAN: >60
GFR NON-AFRICAN AMERICAN: >60
GLUCOSE BLD-MCNC: 108 MG/DL (ref 70–99)
HCT VFR BLD CALC: 38.2 % (ref 36–48)
HEMOGLOBIN: 13 G/DL (ref 12–16)
LV EF: 55 %
LVEF MODALITY: NORMAL
MCH RBC QN AUTO: 29.3 PG (ref 26–34)
MCHC RBC AUTO-ENTMCNC: 34 G/DL (ref 31–36)
MCV RBC AUTO: 86 FL (ref 80–100)
ORGANISM: ABNORMAL
ORGANISM: ABNORMAL
PDW BLD-RTO: 14 % (ref 12.4–15.4)
PLATELET # BLD: 271 K/UL (ref 135–450)
PMV BLD AUTO: 6.8 FL (ref 5–10.5)
POTASSIUM REFLEX MAGNESIUM: 3.7 MMOL/L (ref 3.5–5.1)
RBC # BLD: 4.44 M/UL (ref 4–5.2)
REPORT: NORMAL
SARS-COV-2: NOT DETECTED
SODIUM BLD-SCNC: 137 MMOL/L (ref 136–145)
THIS TEST SENT TO: NORMAL
WBC # BLD: 13.1 K/UL (ref 4–11)

## 2020-05-11 PROCEDURE — 93306 TTE W/DOPPLER COMPLETE: CPT

## 2020-05-11 PROCEDURE — APPSS30 APP SPLIT SHARED TIME 16-30 MINUTES: Performed by: NURSE PRACTITIONER

## 2020-05-11 PROCEDURE — 80061 LIPID PANEL: CPT

## 2020-05-11 PROCEDURE — 85027 COMPLETE CBC AUTOMATED: CPT

## 2020-05-11 PROCEDURE — 99233 SBSQ HOSP IP/OBS HIGH 50: CPT | Performed by: INTERNAL MEDICINE

## 2020-05-11 PROCEDURE — 6360000002 HC RX W HCPCS: Performed by: INTERNAL MEDICINE

## 2020-05-11 PROCEDURE — 80048 BASIC METABOLIC PNL TOTAL CA: CPT

## 2020-05-11 PROCEDURE — 84443 ASSAY THYROID STIM HORMONE: CPT

## 2020-05-11 PROCEDURE — 6370000000 HC RX 637 (ALT 250 FOR IP): Performed by: INTERNAL MEDICINE

## 2020-05-11 PROCEDURE — 6370000000 HC RX 637 (ALT 250 FOR IP): Performed by: NURSE PRACTITIONER

## 2020-05-11 PROCEDURE — 2060000000 HC ICU INTERMEDIATE R&B

## 2020-05-11 PROCEDURE — 2580000003 HC RX 258: Performed by: INTERNAL MEDICINE

## 2020-05-11 PROCEDURE — 94640 AIRWAY INHALATION TREATMENT: CPT

## 2020-05-11 RX ORDER — CIPROFLOXACIN 500 MG/1
500 TABLET, FILM COATED ORAL EVERY 12 HOURS SCHEDULED
Status: DISCONTINUED | OUTPATIENT
Start: 2020-05-11 | End: 2020-05-12 | Stop reason: HOSPADM

## 2020-05-11 RX ORDER — DIPHENHYDRAMINE HCL 25 MG
25 TABLET ORAL ONCE
Status: COMPLETED | OUTPATIENT
Start: 2020-05-11 | End: 2020-05-11

## 2020-05-11 RX ORDER — LANOLIN ALCOHOL/MO/W.PET/CERES
400 CREAM (GRAM) TOPICAL DAILY
Status: DISCONTINUED | OUTPATIENT
Start: 2020-05-11 | End: 2020-05-12 | Stop reason: HOSPADM

## 2020-05-11 RX ADMIN — BUDESONIDE AND FORMOTEROL FUMARATE DIHYDRATE 2 PUFF: 160; 4.5 AEROSOL RESPIRATORY (INHALATION) at 08:24

## 2020-05-11 RX ADMIN — GUAIFENESIN 600 MG: 600 TABLET, EXTENDED RELEASE ORAL at 09:27

## 2020-05-11 RX ADMIN — Medication 10 ML: at 09:30

## 2020-05-11 RX ADMIN — ASPIRIN 81 MG 81 MG: 81 TABLET ORAL at 09:27

## 2020-05-11 RX ADMIN — GUAIFENESIN 600 MG: 600 TABLET, EXTENDED RELEASE ORAL at 19:40

## 2020-05-11 RX ADMIN — Medication 10 ML: at 19:40

## 2020-05-11 RX ADMIN — ALBUTEROL SULFATE 2 PUFF: 108 AEROSOL, METERED RESPIRATORY (INHALATION) at 19:56

## 2020-05-11 RX ADMIN — BUDESONIDE AND FORMOTEROL FUMARATE DIHYDRATE 2 PUFF: 160; 4.5 AEROSOL RESPIRATORY (INHALATION) at 19:56

## 2020-05-11 RX ADMIN — ENOXAPARIN SODIUM 40 MG: 40 INJECTION SUBCUTANEOUS at 09:25

## 2020-05-11 RX ADMIN — ALBUTEROL SULFATE 2 PUFF: 108 AEROSOL, METERED RESPIRATORY (INHALATION) at 08:24

## 2020-05-11 RX ADMIN — MEROPENEM 1 G: 1 INJECTION, POWDER, FOR SOLUTION INTRAVENOUS at 09:29

## 2020-05-11 RX ADMIN — DIPHENHYDRAMINE HYDROCHLORIDE 25 MG: 25 TABLET ORAL at 09:27

## 2020-05-11 RX ADMIN — ALBUTEROL SULFATE 2 PUFF: 108 AEROSOL, METERED RESPIRATORY (INHALATION) at 15:29

## 2020-05-11 RX ADMIN — Medication 400 MG: at 11:03

## 2020-05-11 RX ADMIN — ATENOLOL 50 MG: 50 TABLET ORAL at 09:27

## 2020-05-11 RX ADMIN — CIPROFLOXACIN HYDROCHLORIDE 500 MG: 500 TABLET, FILM COATED ORAL at 19:41

## 2020-05-11 RX ADMIN — FUROSEMIDE 40 MG: 40 TABLET ORAL at 09:27

## 2020-05-11 RX ADMIN — ALBUTEROL SULFATE 2 PUFF: 108 AEROSOL, METERED RESPIRATORY (INHALATION) at 11:33

## 2020-05-11 RX ADMIN — ATORVASTATIN CALCIUM 40 MG: 40 TABLET, FILM COATED ORAL at 09:27

## 2020-05-11 RX ADMIN — PANTOPRAZOLE SODIUM 40 MG: 40 TABLET, DELAYED RELEASE ORAL at 09:27

## 2020-05-11 RX ADMIN — PREDNISONE 40 MG: 20 TABLET ORAL at 09:27

## 2020-05-11 ASSESSMENT — PAIN SCALES - GENERAL
PAINLEVEL_OUTOF10: 0

## 2020-05-11 NOTE — PROGRESS NOTES
Hospitalist Progress Note      PCP: Manuela Wheeler    Date of Admission: 5/8/2020      Subjective:   denies chest pain, nausea, vomiting, shortness of breath, fever or chills. mention feels overall better    Medications:  Reviewed    Infusion Medications   Scheduled Medications    magnesium oxide  400 mg Oral Daily    ciprofloxacin  500 mg Oral 2 times per day    furosemide  40 mg Oral Daily    sodium chloride flush  10 mL Intravenous 2 times per day    enoxaparin  40 mg Subcutaneous Daily    aspirin  81 mg Oral Daily    atenolol  50 mg Oral Daily    atorvastatin  40 mg Oral Daily    guaiFENesin  600 mg Oral BID    [Held by provider] losartan  100 mg Oral Daily    pantoprazole  40 mg Oral QAM AC    predniSONE  40 mg Oral Daily    budesonide-formoterol  2 puff Inhalation BID    albuterol sulfate HFA  2 puff Inhalation 4x daily     PRN Meds: sodium chloride flush, acetaminophen **OR** acetaminophen, polyethylene glycol, promethazine **OR** ondansetron, perflutren lipid microspheres      Intake/Output Summary (Last 24 hours) at 5/11/2020 1457  Last data filed at 5/11/2020 1253  Gross per 24 hour   Intake 710 ml   Output 3650 ml   Net -2940 ml       Physical Exam Performed:    /78   Pulse 88   Temp 97.8 °F (36.6 °C) (Oral)   Resp 18   Ht 5' (1.524 m)   Wt 131 lb 9.8 oz (59.7 kg)   SpO2 96%   BMI 25.70 kg/m²     General appearance: No apparent distress,   HEENT:  Conjunctivae/corneas clear. Neck: Supple, with full range of motion. Respiratory:  Normal respiratory effort. Clear to auscultation, bilaterally without Rales/Wheezes/Rhonchi. Cardiovascular: Regular rate and rhythm with normal S1/S2 without murmurs or rubs  Abdomen: Soft, non-tender, non-distended, normal bowel sounds. Musculoskeletal: No cyanosis or edema bilaterally  Neurologic:  without any focal sensory/motor deficits.  grossly non-focal.  Psychiatric: Alert and oriented, Normal mood  Peripheral Pulses: +2 Code    PT/OT Eval Status: ordered    Dispo -likely today afternoon after echocardiogram or tomorrow early morning    Tammie Brown MD

## 2020-05-11 NOTE — PROGRESS NOTES
[facility] [address]       In Patient  Progress note        Olga Ramsey MD,  [address] FNP [facility]   Daily Progress Note      Admit Date:  5/8/2020  Primary Cardiologist :       CC: CHF/afib     Interval Hx: Ms. Pawan Ramachandran is here with resp failure etiology multifactorial with COPD/ CHF / improving  with net out -4.6 liters   COPD on steroids  & improving   afib declined anticoag   COVID pending  UTI on abx VSS afebrile HR 80 afib SV02 97% RA   She was hypotensive now improved   Chest xray 5/8/20: Probable mild interstitial pulmonary edema. Echo pending  Replace magnesium / do TSH  /  lipids      Card meds cozaar lasix Lipitor atenolol asa     PMH: CAD with previous balloon angioplasties without stents 25 years ago  echo stress test in January 2028 showed normal ejection fraction and negative for coronary ischemia  Hypertension  Hyperlipidemia  Atrial fibrillation paroxysmal and declines to take anticoagulation  COPD and sees Dr. Timothy Oliveira    Due to the current efforts to prevent transmission of COVID-19 and also the need to preserve PPE for other caregivers, a face-to-face encounter with the patient was not performed. That being said, all relevant records and diagnostic tests were reviewed, including laboratory results and imaging. Please reference any relevant documentation elsewhere. Care will be coordinated with the primary service.      Objective:   /78   Pulse 85   Temp 97.8 °F (36.6 °C) (Oral)   Resp 17   Ht 5' (1.524 m)   Wt 131 lb 9.8 oz (59.7 kg)   SpO2 97%   BMI 25.70 kg/m²       Intake/Output Summary (Last 24 hours) at 5/11/2020 0941  Last data filed at 5/11/2020 0920  Gross per 24 hour   Intake 950 ml   Output 1800 ml   Net -850 ml     Wt Readings from Last 3 Encounters:   05/11/20 131 lb 9.8 oz (59.7 kg)   03/01/19 128 lb (58.1 kg)       Physical Exam:   /78   Pulse 85   Temp 97.8 °F (36.6 °C) (Oral)   Resp 17   Ht 5' (1.524 m)   Wt 131 lb 9.8 oz (59.7 kg)   SpO2 97%   BMI 25.70 kg/m²   BP Readings from Last 3 Encounters:   05/11/20 120/78   03/01/19 (!) 137/102     Pulse Readings from Last 3 Encounters:   05/11/20 85   03/01/19 92       Intake/Output Summary (Last 24 hours) at 5/11/2020 0941  Last data filed at 5/11/2020 0920  Gross per 24 hour   Intake 950 ml   Output 1800 ml   Net -850 ml     Wt Readings from Last 2 Encounters:   05/11/20 131 lb 9.8 oz (59.7 kg)   03/01/19 128 lb (58.1 kg)     Medications:    meropenem  1 g Intravenous Q12H    furosemide  40 mg Oral Daily    sodium chloride flush  10 mL Intravenous 2 times per day    enoxaparin  40 mg Subcutaneous Daily    aspirin  81 mg Oral Daily    atenolol  50 mg Oral Daily    atorvastatin  40 mg Oral Daily    guaiFENesin  600 mg Oral BID    [Held by provider] losartan  100 mg Oral Daily    pantoprazole  40 mg Oral QAM AC    predniSONE  40 mg Oral Daily    budesonide-formoterol  2 puff Inhalation BID    albuterol sulfate HFA  2 puff Inhalation 4x daily       Recent Labs     05/09/20  0601 05/10/20  0510 05/11/20  0650   WBC 14.0* 17.1* 13.1*   HGB 12.9 12.5 13.0    266 271     BMP:    Recent Labs     05/09/20  0601 05/10/20  0510 05/11/20  0650   * 135* 137   K 3.8 3.5 3.7   CO2 23 27 27   BUN 18 24* 19   CREATININE 0.8 0.8 0.8     LIVR:   Recent Labs     05/09/20  0601   AST 15   ALT 28     Reviewed  available lab work,  EKGs, images, LHC       Assessment    resp failure etiology multifactorial with COPD/ CHF / improving      HFpEF /acute on chronic   on lasix /sCr wnl   Pro BNP 2210   -4.6 liters      COPD   on steroids  & improving   Dr. Marye Essex follows     CAD with previous balloon angioplasties without stents 25 years ago  echo stress test in January 2020 showed normal ejection fraction and negative for coronary ischemia  stable     Atrial fibrillation paroxysmal   declines to take anticoagulation  [id number] Score for Atrial Fibrillation Stroke Risk   Risk   Factors  Component Value   C CHF Yes 1   H HTN No 0   A2 Age >= 76 Yes,  (80 y.o.) 2   D DM No 0   S2 Prior Stroke/TIA No 0   V Vascular Disease No 0   A Age 74-69 No,  (80 y.o.) 0   Sc Sex female 1    [id number]  Score  4       COVID   Pending    UTI   on abx     Hypertension  was soft /  improving     HLD   Will get lipids    Low magnesium   Replace     Plan:  Replace magnesium / do TSH  /  lipids    Card meds cozaar lasix Lipitor atenolol asa   Awaiting COVID result  / cont diuresis   will follow       Donnal Prudent APRN, CVNP   Still dyspneic but showing signs of improvement. Has known CAD with previous stents. At this time the vitals are stabilizing. Continuing to have good diuresis. Following expectantly. COVID-19 study is pending.   Carissa Thorpe MD, [facility]

## 2020-05-11 NOTE — PLAN OF CARE
Problem: Falls - Risk of:  Goal: Will remain free from falls  Description: Will remain free from falls  Outcome: Met This Shift   Pt has steady gait and scored as low fall risk. Problem: Breathing Pattern - Ineffective:  Goal: Ability to achieve and maintain a regular respiratory rate will improve  Description: Ability to achieve and maintain a regular respiratory rate will improve  Outcome: Ongoing   Pt's SPO2 is 96% on room air. RR 16bpm. Lung sounds clear and diminished. COVID-19 test still pending. Will continue to monitor.

## 2020-05-11 NOTE — PROGRESS NOTES
Updated pt's daughter James about pt's current condition and plan of care.  Electronically signed by Trinh Galvin RN on 5/11/2020 at 7:39 AM

## 2020-05-11 NOTE — PROGRESS NOTES
Spoke with patient's daughter/POC, James. Updated on lab results, pending diagnostics, treatment and discharge plan

## 2020-05-11 NOTE — CARE COORDINATION
Case Management Assessment           Daily Note                 Date/ Time of Note: 5/11/2020 4:10 PM         Note completed by: Bryan Smith    Patient Name: Tu Fountain  YOB: 1933    Diagnosis:Acute respiratory failure ([facility] [J96.00]  Acute respiratory failure ([facility] [J96.00]  Patient Admission Status: Inpatient    Date of Admission:5/8/2020  6:18 AM Length of Stay: 3 GLOS:      Current Plan of Care: awaiting test results  ________________________________________________________________________________________  PT AM-PAC:   / 24 per last evaluation on:     OT AM-PAC:   / 24 per last evaluation on:     DME Needs for discharge: n/a  ________________________________________________________________________________________  Discharge Plan: Home    Tentative discharge date: TBD    Current barriers to discharge: medical clearance    Referrals completed: Not Applicable    Resources/ information provided: Not indicated at this time  ________________________________________________________________________________________  Case Management Notes:  Patient is from home with son. No CM needs at this time. Family to transport home. Claudette and her family were provided with choice of provider; she and her family are in agreement with the discharge plan.     Care Transition Patient: No    Bryan Smith RN  [facility] [facility]  Case Management Department  Ph: [phone]  Fax: [phone]

## 2020-05-12 VITALS
SYSTOLIC BLOOD PRESSURE: 120 MMHG | BODY MASS INDEX: 25.84 KG/M2 | RESPIRATION RATE: 18 BRPM | OXYGEN SATURATION: 95 % | WEIGHT: 131.61 LBS | TEMPERATURE: 97.2 F | HEART RATE: 75 BPM | DIASTOLIC BLOOD PRESSURE: 72 MMHG | HEIGHT: 60 IN

## 2020-05-12 LAB
BLOOD CULTURE, ROUTINE: NORMAL
CHOLESTEROL, TOTAL: 132 MG/DL (ref 0–199)
HDLC SERPL-MCNC: 50 MG/DL (ref 40–60)
LDL CHOLESTEROL CALCULATED: 63 MG/DL
TRIGL SERPL-MCNC: 94 MG/DL (ref 0–150)
TSH REFLEX: 1.26 UIU/ML (ref 0.27–4.2)
VLDLC SERPL CALC-MCNC: 19 MG/DL

## 2020-05-12 PROCEDURE — 94640 AIRWAY INHALATION TREATMENT: CPT

## 2020-05-12 PROCEDURE — 6370000000 HC RX 637 (ALT 250 FOR IP): Performed by: INTERNAL MEDICINE

## 2020-05-12 PROCEDURE — 6370000000 HC RX 637 (ALT 250 FOR IP): Performed by: NURSE PRACTITIONER

## 2020-05-12 PROCEDURE — 2580000003 HC RX 258: Performed by: INTERNAL MEDICINE

## 2020-05-12 PROCEDURE — 99233 SBSQ HOSP IP/OBS HIGH 50: CPT | Performed by: INTERNAL MEDICINE

## 2020-05-12 PROCEDURE — 6360000002 HC RX W HCPCS: Performed by: INTERNAL MEDICINE

## 2020-05-12 RX ORDER — FUROSEMIDE 40 MG/1
40 TABLET ORAL DAILY
Qty: 60 TABLET | Refills: 3 | Status: SHIPPED | OUTPATIENT
Start: 2020-05-13 | End: 2020-05-12

## 2020-05-12 RX ORDER — FUROSEMIDE 40 MG/1
40 TABLET ORAL DAILY
Qty: 60 TABLET | Refills: 3 | Status: SHIPPED | OUTPATIENT
Start: 2020-05-13

## 2020-05-12 RX ORDER — PREDNISONE 20 MG/1
40 TABLET ORAL DAILY
Qty: 10 TABLET | Refills: 0 | Status: SHIPPED | OUTPATIENT
Start: 2020-05-13 | End: 2020-05-18

## 2020-05-12 RX ORDER — PREDNISONE 20 MG/1
40 TABLET ORAL DAILY
Qty: 10 TABLET | Refills: 0 | Status: SHIPPED | OUTPATIENT
Start: 2020-05-13 | End: 2020-05-12

## 2020-05-12 RX ORDER — CIPROFLOXACIN 500 MG/1
500 TABLET, FILM COATED ORAL EVERY 12 HOURS SCHEDULED
Qty: 14 TABLET | Refills: 0 | Status: SHIPPED | OUTPATIENT
Start: 2020-05-12 | End: 2020-05-12

## 2020-05-12 RX ORDER — CIPROFLOXACIN 500 MG/1
500 TABLET, FILM COATED ORAL EVERY 12 HOURS SCHEDULED
Qty: 14 TABLET | Refills: 0 | Status: SHIPPED | OUTPATIENT
Start: 2020-05-12 | End: 2020-05-19

## 2020-05-12 RX ADMIN — GUAIFENESIN 600 MG: 600 TABLET, EXTENDED RELEASE ORAL at 08:52

## 2020-05-12 RX ADMIN — BUDESONIDE AND FORMOTEROL FUMARATE DIHYDRATE 2 PUFF: 160; 4.5 AEROSOL RESPIRATORY (INHALATION) at 08:28

## 2020-05-12 RX ADMIN — ATENOLOL 50 MG: 50 TABLET ORAL at 08:52

## 2020-05-12 RX ADMIN — FUROSEMIDE 40 MG: 40 TABLET ORAL at 08:52

## 2020-05-12 RX ADMIN — ATORVASTATIN CALCIUM 40 MG: 40 TABLET, FILM COATED ORAL at 08:52

## 2020-05-12 RX ADMIN — Medication 400 MG: at 08:52

## 2020-05-12 RX ADMIN — Medication 10 ML: at 08:52

## 2020-05-12 RX ADMIN — CIPROFLOXACIN HYDROCHLORIDE 500 MG: 500 TABLET, FILM COATED ORAL at 08:52

## 2020-05-12 RX ADMIN — PREDNISONE 40 MG: 20 TABLET ORAL at 08:52

## 2020-05-12 RX ADMIN — ENOXAPARIN SODIUM 40 MG: 40 INJECTION SUBCUTANEOUS at 09:40

## 2020-05-12 RX ADMIN — ALBUTEROL SULFATE 2 PUFF: 108 AEROSOL, METERED RESPIRATORY (INHALATION) at 08:28

## 2020-05-12 ASSESSMENT — PAIN SCALES - GENERAL
PAINLEVEL_OUTOF10: 0
PAINLEVEL_OUTOF10: 0

## 2020-05-12 NOTE — PROGRESS NOTES
The [address] [place] [address]  Cardiology Daily Progress Note  5/12/2020,10:05 AM      Sharon Root [facility]  [address]  Primary cardiologist:    Admit Date:  5/8/2020  Hospital Day: Hospital Day: 5  Subjective:  Ms. Samantha Ferguson is awake and alert. She is hungry. She is reportedly COVID-19 negative. Vitals are stable and she is still in atrial fibrillation with a rate of 66. No appreciable pauses through the night. Objective:   /72   Pulse 75   Temp 97.2 °F (36.2 °C) (Oral)   Resp 18   Ht 5' (1.524 m)   Wt 131 lb 9.8 oz (59.7 kg)   SpO2 95%   BMI 25.70 kg/m²       Intake/Output Summary (Last 24 hours) at 5/12/2020 1005  Last data filed at 5/12/2020 0600  Gross per 24 hour   Intake 720 ml   Output 3750 ml   Net -3030 ml       TELEMETRY: Atrial fib 66  Physical Examination:    Vitals:    05/12/20 0445 05/12/20 0600 05/12/20 0829 05/12/20 0835   BP: 121/74   120/72   Pulse: 77 74  75   Resp: 16  16 18   Temp: 97.3 °F (36.3 °C)   97.2 °F (36.2 °C)   TempSrc: Oral   Oral   SpO2: 94%  98% 95%   Weight:       Height:            Constitutional and General Appearance:  Healthy. And alert . HEENT: eyes and ears intact. No nasal masses  THYROID: not enlarged  LUNGS:  · Clear to auscultation and percussion  HEART and VASCULAR:  · The apical impulses not displaced  · Heart tones are crisp and normal  · Cervical veins are not engorged  · The carotid upstroke is normal in amplitude and contour without delay or bruit  · Peripheral pulses are symmetrical and full  · There is no clubbing, cyanosis of the extremities. · No peripheral edema  · Femoral Arteries: 2+ and equal  · Pedal Pulses: 2+ and equal   ABDOMEN[de-identified]  · No masses or tenderness  · Liver/Spleen: No Abnormalities Noted  NEUROLOGICAL:  . Moves all extremities to command. Cranial nerves 2-12 are in tact.   PSYCHIATRIC: alert and lucid and oriented and appropriate  SKIN: No lesions or rashes  LYMPH NODES: none enlarged      Medications:    magnesium oxide  400 mg Oral Daily    ciprofloxacin  500 mg Oral 2 times per day    furosemide  40 mg Oral Daily    sodium chloride flush  10 mL Intravenous 2 times per day    enoxaparin  40 mg Subcutaneous Daily    aspirin  81 mg Oral Daily    atenolol  50 mg Oral Daily    atorvastatin  40 mg Oral Daily    guaiFENesin  600 mg Oral BID    [Held by provider] losartan  100 mg Oral Daily    pantoprazole  40 mg Oral QAM AC    predniSONE  40 mg Oral Daily    budesonide-formoterol  2 puff Inhalation BID    albuterol sulfate HFA  2 puff Inhalation 4x daily       sodium chloride flush, acetaminophen **OR** acetaminophen, polyethylene glycol, promethazine **OR** ondansetron, perflutren lipid microspheres    Lab Data:  CBC:   Recent Labs     05/10/20  0510 05/11/20  0650   WBC 17.1* 13.1*   HGB 12.5 13.0   HCT 37.1 38.2   MCV 86.6 86.0    271     BMP:   Recent Labs     05/10/20  0510 05/11/20  0650   * 137   K 3.5 3.7   CL 93* 97*   CO2 27 27   BUN 24* 19   CREATININE 0.8 0.8     Troponin:Troponin:   Lab Results   Component Value Date    TROPONINI <0.01 05/08/2020     LIVER PROFILE: No results for input(s): AST, ALT, LIPASE, BILIDIR, BILITOT, ALKPHOS in the last 72 hours. Invalid input(s): AMYLASE,  ALB  PT/INR: No results for input(s): PROTIME, INR in the last 72 hours. APTT: No results for input(s): APTT in the last 72 hours. BNP:  No results for input(s): BNP in the last 72 hours. IMAGING: as noted    Assessment:  Patient Active Problem List    Diagnosis Date Noted    Acute on chronic diastolic congestive heart failure ([facility] 05/09/2020    Chronic a-fib 05/09/2020    Acute respiratory failure ([facility] 05/08/2020       Plan:   Continue current medications. Core Measures:  · Discharge instructions:   · LVEF documented:   · ACEI for LV dysfunction:   ·    Overall cardiac status looks pretty stable to me. Still in atrial fibrillation chronically.   The dyspnea seems to be improved. At this time continuation of current therapy. We do not anticipate any further intervention cardiac wise. Thanks for allowing me  the opportunity to participate in the evaluation and care of your patient.  If there are questions please call me [phone]    This note was likely completed using voice recognition technology and may contain unintended grammatical, phraseology and/or punctuation  errors      Lucy Stafford M.D.,[facility]  5/12/2020 10:05 AM

## 2020-05-12 NOTE — PLAN OF CARE
Problem: Breathing Pattern - Ineffective:  Goal: Ability to achieve and maintain a regular respiratory rate will improve  Description: Ability to achieve and maintain a regular respiratory rate will improve  Outcome: Ongoing  Note: On room air, no shortness of breath at this time, no dyspnea with activity. SpO2 >95%. Will update as needed     Problem: Falls - Risk of:  Goal: Will remain free from falls  Description: Will remain free from falls  Outcome: Ongoing  Note: Up ad araceli, steady on feet, no dizziness/lightheadedness. Low risk for falls, call light and bedside items within reach. Will update as needed.    Goal: Absence of physical injury  Description: Absence of physical injury  Outcome: Ongoing

## 2020-05-12 NOTE — DISCHARGE INSTR - COC
Continuity of Care Form    Patient Name: Terry Morgan   :  8/15/1933  MRN:  [id number]    Admit date:  2020  Discharge date:  ***    Code Status Order: Full Code   Advance Directives:   Advance Care Flowsheet Documentation     Date/Time Healthcare Directive Type of Healthcare Directive Copy in [address] Agent's Name Healthcare Agent's Phone Number    20 1038  Yes, patient has an advance directive for healthcare treatment  Durable power of  for health care  --  Adult Children  Zoey Rafael  [phone]          Admitting Physician:  Ifrah Veag MD  PCP: [id number] [facility],[address]    Discharging Nurse: [facility] Unit/Room#: 9133/7406-83  Discharging Unit Phone Number: ***    Emergency Contact:   Extended Emergency Contact Information  Primary Emergency Contact: Debora Balderas Phone: [phone]  Work Phone: [phone]  Relation: Child    Past Surgical History:  History reviewed. No pertinent surgical history. Immunization History: There is no immunization history on file for this patient.     Active Problems:  Patient Active Problem List   Diagnosis Code    Acute respiratory failure ([facility]) J96.00    Acute on chronic diastolic congestive heart failure ([facility]) I50.33    Chronic a-fib I48.20       Isolation/Infection:   Isolation          Contact        Patient Infection Status     Infection Onset Added Last Indicated Last Indicated By Review Planned Expiration Resolved Resolved By    ESBL (Extended Spectrum Beta Lactamase) 05/08/20 05/10/20 05/08/20 Culture, Urine        MDRO (multi-drug resistant organism) 05/08/20 05/10/20 05/08/20 Culture, Urine        Resolved    COVID-19 Rule Out 20 COVID-19 (Ordered)   20 Rule-Out Test Resulted          Nurse Assessment:  Last Vital Signs: /72   Pulse 75   Temp 97.2 °F (36.2 °C) (Oral)   Resp 18   Ht 5' (1.524 m)   Wt 131 lb 9.8 oz (59.7 kg) SpO2 95%   BMI 25.70 kg/m²     Last documented pain score (0-10 scale): Pain Level: 0  Last Weight:   Wt Readings from Last 1 Encounters:   20 131 lb 9.8 oz (59.7 kg)     Mental Status:  {IP PT MENTAL STATUS:}    IV Access:  { FÁTIMA IV ACCESS:676984945}    Nursing Mobility/ADLs:  Walking   {CHP DME ASPF:146224211}  Transfer  {CHP DME OJYW:977497241}  Bathing  {CHP DME ICHX:806148371}  Dressing  {CHP DME VHEU:658234520}  Toileting  {CHP DME JJDZ:051329271}  Feeding  {CHP DME HTUK:204725856}  Med Admin  {CHP DME FORP:109792457}  Med Delivery   {[facility] MED Delivery:886059866}    Wound Care Documentation and Therapy:        Elimination:  Continence:   · Bowel: {YES / CC:76823}  · Bladder: {YES / XF:83515}  Urinary Catheter: {Urinary Catheter:549306547}   Colostomy/Ileostomy/Ileal Conduit: {YES / BRYANT:80612}       Date of Last BM: ***    Intake/Output Summary (Last 24 hours) at 2020 1152  Last data filed at 2020 1023  Gross per 24 hour   Intake 820 ml   Output 2400 ml   Net -1580 ml     I/O last 3 completed shifts:   In: 1080 [P.O.:1080]  Out: 3750 [Urine:3750]    Safety Concerns:     [address] Safety Concerns:771391545}    Impairments/Disabilities:      [address] Impairments/Disabilities:498071739}    Nutrition Therapy:  Current Nutrition Therapy:   [address] [employer] Diet List:770400254}    Routes of Feeding: {P DME Other Feedings:499464422}  Liquids: {Slp liquid thickness:57020}  Daily Fluid Restriction: {CHP DME Yes amt example:109154202}  Last Modified Barium Swallow with Video (Video Swallowing Test): {Done Not Done BPYZ:060412350}    Treatments at the Time of Hospital Discharge:   Respiratory Treatments: ***  Oxygen Therapy:  {Therapy; copd oxygen:86437}  Ventilator:    { CC Vent HPJI:804850940}    Rehab Therapies: {THERAPEUTIC INTERVENTION:0417297281}  Weight Bearing Status/Restrictions: [address] ROYAL Weight Bearin}  Other Medical Equipment (for information only, NOT a DME order): {EQUIPMENT:101828852}  Other Treatments: ***    Patient's personal belongings (please select all that are sent with patient):  {CHP DME Belongings:422233636}    RN SIGNATURE:  {Esignature:597721702}    CASE MANAGEMENT/SOCIAL WORK SECTION    Inpatient Status Date: ***    Readmission Risk Assessment Score:  Readmission Risk              Risk of Unplanned Readmission:        12           Discharging to Facility/ Agency   · Name:   · Address:  · Phone:  · Fax:    Dialysis Facility (if applicable)   · Name:  · Address:  · Dialysis Schedule:  · Phone:  · Fax:    / signature: {Esignature:541833274}    PHYSICIAN SECTION    Prognosis: Good    Condition at Discharge: Stable    Rehab Potential (if transferring to Rehab): Good    Recommended Labs or Other Treatments After Discharge: Follow up with PCP, Cardiologist    Physician Certification: I certify the above information and transfer of Consuelo Paula  is necessary for the continuing treatment of the diagnosis listed and that she requires Home Care for greater 30 days.      Update Admission H&P: No change in H&P    PHYSICIAN SIGNATURE:  Electronically signed by Blanca Oconnor MD on 5/12/20 at 11:52 AM EDT

## 2020-05-12 NOTE — PROGRESS NOTES
Offered to call pt's point of contact, pt states she will call them, but does request cardiologist to contact her daughter. Will pass on to AM team. Will update as needed.

## 2020-05-13 ENCOUNTER — CARE COORDINATION (OUTPATIENT)
Dept: CASE MANAGEMENT | Age: 85
End: 2020-05-13

## 2020-05-14 ENCOUNTER — CARE COORDINATION (OUTPATIENT)
Dept: CASE MANAGEMENT | Age: 85
End: 2020-05-14

## 2020-05-14 NOTE — CARE COORDINATION
[facility] Transitions Initial Follow Up Call    Call within 2 business days of discharge: Yes    Patient:  Charli Rodriguez  Patient :  8/15/1933  MRN:  [id number]    Reason for Admission:  covid 19 monitoring   Discharge Date:  20   RARS:  Alex      2nd CTC attempt to reach Pt regarding recent hospital discharge. CTC left voice recording with call back number requesting a call back. Follow up appointments:    No future appointments. GUS Lee, RN  Care Transition Coordinator  Contact Number:  [phone]

## 2020-05-16 NOTE — DISCHARGE SUMMARY
Hospital Medicine Discharge Summary    Patient ID: Gideon Mendez      Patient's PCP: Kvng Best Date: 5/8/2020     Discharge Date: 5/12/2020     Admitting Physician: Denice Griffin MD     Discharge Physician: Caren Ramirez MD     Discharge Diagnoses:  Acute hypoxic respiratory failure likely secondary to acute on chronic diastolic CHF exacerbation, COPD exacerbation  Urinary tract infection  Hyperlipidemia  Hypertension  Hypomagnesemia hyponatremia  History of paroxysmal atrial fibrillation       Active Hospital Problems    Diagnosis Date Noted    Acute on chronic diastolic congestive heart failure ([address] [I50.33] 05/09/2020    Chronic a-fib [I48.20] 05/09/2020    Acute respiratory failure ([facility] [address] [J96.00] 05/08/2020       The patient was seen and examined on day of discharge and this discharge summary is in conjunction with any daily progress note from day of discharge.     Hospital Course:   Patient is a 54-year-old female who presented to hospital for shortness of breath likely secondary to acute on chronic diastolic CHF exacerbation, COPD exacerbation.     Assessment  Acute hypoxic respiratory failure likely secondary to acute on chronic diastolic CHF exacerbation, COPD exacerbation  Urinary tract infection  Hyperlipidemia  Hypertension  Hypomagnesemia hyponatremia  History of paroxysmal atrial fibrillation     Plan  Continue Lasix, cardiology following appreciate recommendations  Continue prednisone, continue to wean off oxygen  Continue statin, atenolol  Continue ciprofloxacin 500 twice daily  Continue Mucinex  Continue to monitor BMP  COVID-19 tested negative  Most likely discharge today after echocardiogram after cardiology clearance  Diet: DIET CARDIAC;  Code Status: Full Code        Exam:     /72   Pulse 75   Temp 97.2 °F (36.2 °C) (Oral)   Resp 18   Ht 5' (1.524 m)   Wt 131 lb 9.8 oz (59.7 kg)   SpO2 95%   BMI 25.70 kg/m²     General appearance: No apparent distress  HEENT:  Conjunctivae/corneas clear. Neck: Supple, with full range of motion. No jugular venous distention. Trachea midline. Respiratory:  Normal respiratory effort. Clear to auscultation, bilaterally without Rales/Wheezes/Rhonchi. Cardiovascular: Regular rate and rhythm with normal S1/S2 without murmurs, rubs or gallops. Abdomen: Soft, non-tender, non-distended, normal bowel sounds. Musculoskelatal: No clubbing, cyanosis or edema bilaterally. Full range of motion without deformity. Skin: Skin color, texture, turgor normal.  No rashes or lesions. Neurologic: no focal neurologic deficits. Cranial nerves: II-XII intact, grossly non-focal.  Psychiatric: Alert and oriented, normal mood    Consults:     IP CONSULT TO HOSPITALIST  IP CONSULT TO CARDIOLOGY  PHARMACY TO DOSE VANCOMYCIN      Code Status:  Prior    Activity: activity as tolerated    Labs:  For convenience and continuity at follow-up the following most recent labs are provided:      CBC:    Lab Results   Component Value Date    WBC 13.1 05/11/2020    HGB 13.0 05/11/2020    HCT 38.2 05/11/2020     05/11/2020       Renal:    Lab Results   Component Value Date     05/11/2020    K 3.7 05/11/2020    CL 97 05/11/2020    CO2 27 05/11/2020    BUN 19 05/11/2020    CREATININE 0.8 05/11/2020    CALCIUM 9.4 05/11/2020       Discharge Medications:     Discharge Medication List as of 5/12/2020 12:29 PM           Details   furosemide (LASIX) 40 MG tablet Take 1 tablet by mouth daily, Disp-60 tablet, R-3Print      ciprofloxacin (CIPRO) 500 MG tablet Take 1 tablet by mouth every 12 hours for 7 days, Disp-14 tablet, R-0Print      predniSONE (DELTASONE) 20 MG tablet Take 2 tablets by mouth daily for 5 days, Disp-10 tablet, R-0Print              Details   omeprazole (PRILOSEC) 20 MG delayed release capsule Take 20 mg by mouth dailyHistorical Med      loratadine (CLARITIN) 10 MG tablet Take 10 mg by mouth dailyHistorical Med      guaiFENesin

## 2020-05-16 NOTE — PROGRESS NOTES
Assessment/Plan:    Active Hospital Problems    Diagnosis    Acute on chronic diastolic congestive heart failure (HCC) [I50.33]    Chronic a-fib [I48.20]    Acute respiratory failure ([facility] [address] [J96.00]     Patient is a 61-year-old female who presented to hospital for shortness of breath likely secondary to acute on chronic diastolic CHF exacerbation, COPD exacerbation.     Assessment  Acute hypoxic respiratory failure likely secondary to acute on chronic diastolic CHF exacerbation, COPD exacerbation  Urinary tract infection  Hyperlipidemia  Hypertension  Hypomagnesemia hyponatremia  History of paroxysmal atrial fibrillation    Plan  Continue Lasix, cardiology following appreciate recommendations  Continue prednisone, continue to wean off oxygen  Continue statin, atenolol  Continue ciprofloxacin 500 twice daily  Continue Mucinex  Continue to monitor BMP  COVID-19 tested negative  Most likely discharge today after echocardiogram after cardiology clearance  Diet: No diet orders on file  Code Status: Prior    PT/OT Eval Status: ordered    Dispo -likely today afternoon after echocardiogram or tomorrow early morning    Cristel Aldana MD

## 2025-01-06 ENCOUNTER — APPOINTMENT (OUTPATIENT)
Dept: GENERAL RADIOLOGY | Age: 89
End: 2025-01-06
Payer: MEDICARE

## 2025-01-06 ENCOUNTER — HOSPITAL ENCOUNTER (OUTPATIENT)
Age: 89
Setting detail: OBSERVATION
Discharge: HOME | End: 2025-01-07
Attending: EMERGENCY MEDICINE | Admitting: FAMILY MEDICINE
Payer: MEDICARE

## 2025-01-06 DIAGNOSIS — N39.0 ACUTE LOWER UTI: ICD-10-CM

## 2025-01-06 DIAGNOSIS — R07.9 CHEST PAIN, UNSPECIFIED TYPE: Primary | ICD-10-CM

## 2025-01-06 LAB
BASOPHILS # BLD: 0.1 K/UL (ref 0–0.2)
BASOPHILS NFR BLD: 0.9 %
DEPRECATED RDW RBC AUTO: 13.6 % (ref 12.4–15.4)
EOSINOPHIL # BLD: 0 K/UL (ref 0–0.6)
EOSINOPHIL NFR BLD: 0.4 %
HCT VFR BLD AUTO: 38 % (ref 36–48)
HGB BLD-MCNC: 12.9 G/DL (ref 12–16)
LYMPHOCYTES # BLD: 1.6 K/UL (ref 1–5.1)
LYMPHOCYTES NFR BLD: 21 %
MCH RBC QN AUTO: 29 PG (ref 26–34)
MCHC RBC AUTO-ENTMCNC: 34 G/DL (ref 31–36)
MCV RBC AUTO: 85.5 FL (ref 80–100)
MONOCYTES # BLD: 0.7 K/UL (ref 0–1.3)
MONOCYTES NFR BLD: 9.6 %
NEUTROPHILS # BLD: 5.1 K/UL (ref 1.7–7.7)
NEUTROPHILS NFR BLD: 68.1 %
PLATELET # BLD AUTO: 182 K/UL (ref 135–450)
PMV BLD AUTO: 7 FL (ref 5–10.5)
RBC # BLD AUTO: 4.45 M/UL (ref 4–5.2)
WBC # BLD AUTO: 7.5 K/UL (ref 4–11)

## 2025-01-06 PROCEDURE — 71045 X-RAY EXAM CHEST 1 VIEW: CPT

## 2025-01-06 PROCEDURE — 99285 EMERGENCY DEPT VISIT HI MDM: CPT

## 2025-01-06 PROCEDURE — 93005 ELECTROCARDIOGRAM TRACING: CPT | Performed by: EMERGENCY MEDICINE

## 2025-01-06 PROCEDURE — 85025 COMPLETE CBC W/AUTO DIFF WBC: CPT

## 2025-01-06 PROCEDURE — 36415 COLL VENOUS BLD VENIPUNCTURE: CPT

## 2025-01-06 PROCEDURE — 80162 ASSAY OF DIGOXIN TOTAL: CPT

## 2025-01-06 PROCEDURE — 6370000000 HC RX 637 (ALT 250 FOR IP): Performed by: EMERGENCY MEDICINE

## 2025-01-06 PROCEDURE — 80053 COMPREHEN METABOLIC PANEL: CPT

## 2025-01-06 PROCEDURE — 84484 ASSAY OF TROPONIN QUANT: CPT

## 2025-01-06 RX ORDER — ACETAMINOPHEN 500 MG
500 TABLET ORAL
Status: COMPLETED | OUTPATIENT
Start: 2025-01-06 | End: 2025-01-06

## 2025-01-06 RX ORDER — LORAZEPAM 0.5 MG/1
0.5 TABLET ORAL ONCE
Status: COMPLETED | OUTPATIENT
Start: 2025-01-06 | End: 2025-01-06

## 2025-01-06 RX ADMIN — ACETAMINOPHEN 500 MG: 500 TABLET ORAL at 23:37

## 2025-01-06 RX ADMIN — LORAZEPAM 0.5 MG: 0.5 TABLET ORAL at 23:37

## 2025-01-07 VITALS
BODY MASS INDEX: 21.96 KG/M2 | SYSTOLIC BLOOD PRESSURE: 150 MMHG | DIASTOLIC BLOOD PRESSURE: 87 MMHG | RESPIRATION RATE: 16 BRPM | WEIGHT: 108.91 LBS | HEART RATE: 73 BPM | TEMPERATURE: 97.4 F | HEIGHT: 59 IN | OXYGEN SATURATION: 93 %

## 2025-01-07 PROBLEM — R07.9 CHEST PAIN: Status: ACTIVE | Noted: 2025-01-07

## 2025-01-07 LAB
ALBUMIN SERPL-MCNC: 4 G/DL (ref 3.4–5)
ALBUMIN/GLOB SERPL: 1.6 {RATIO} (ref 1.1–2.2)
ALP SERPL-CCNC: 95 U/L (ref 40–129)
ALT SERPL-CCNC: 7 U/L (ref 10–40)
ANION GAP SERPL CALCULATED.3IONS-SCNC: 10 MMOL/L (ref 3–16)
ANION GAP SERPL CALCULATED.3IONS-SCNC: 9 MMOL/L (ref 3–16)
APTT BLD: 34.4 SEC (ref 22.1–36.4)
AST SERPL-CCNC: 24 U/L (ref 15–37)
BACTERIA URNS QL MICRO: ABNORMAL /HPF
BILIRUB SERPL-MCNC: 0.5 MG/DL (ref 0–1)
BILIRUB UR QL STRIP.AUTO: NEGATIVE
BUN SERPL-MCNC: 10 MG/DL (ref 7–20)
BUN SERPL-MCNC: 12 MG/DL (ref 7–20)
CALCIUM SERPL-MCNC: 10 MG/DL (ref 8.3–10.6)
CALCIUM SERPL-MCNC: 10.1 MG/DL (ref 8.3–10.6)
CHLORIDE SERPL-SCNC: 97 MMOL/L (ref 99–110)
CHLORIDE SERPL-SCNC: 99 MMOL/L (ref 99–110)
CHOLEST SERPL-MCNC: 115 MG/DL (ref 0–199)
CLARITY UR: ABNORMAL
CO2 SERPL-SCNC: 26 MMOL/L (ref 21–32)
CO2 SERPL-SCNC: 29 MMOL/L (ref 21–32)
COLOR UR: YELLOW
CREAT SERPL-MCNC: 0.7 MG/DL (ref 0.6–1.2)
CREAT SERPL-MCNC: 0.8 MG/DL (ref 0.6–1.2)
DEPRECATED RDW RBC AUTO: 13.7 % (ref 12.4–15.4)
DIGOXIN SERPL-MCNC: <0.3 NG/ML (ref 0.8–2)
EKG DIAGNOSIS: NORMAL
EKG Q-T INTERVAL: 390 MS
EKG QRS DURATION: 94 MS
EKG QTC CALCULATION (BAZETT): 460 MS
EKG R AXIS: 51 DEGREES
EKG T AXIS: 48 DEGREES
EKG VENTRICULAR RATE: 84 BPM
EPI CELLS #/AREA URNS AUTO: 0 /HPF (ref 0–5)
GFR SERPLBLD CREATININE-BSD FMLA CKD-EPI: 69 ML/MIN/{1.73_M2}
GFR SERPLBLD CREATININE-BSD FMLA CKD-EPI: 81 ML/MIN/{1.73_M2}
GLUCOSE SERPL-MCNC: 100 MG/DL (ref 70–99)
GLUCOSE SERPL-MCNC: 110 MG/DL (ref 70–99)
GLUCOSE UR STRIP.AUTO-MCNC: NEGATIVE MG/DL
HCT VFR BLD AUTO: 40.4 % (ref 36–48)
HDLC SERPL-MCNC: 44 MG/DL (ref 40–60)
HGB BLD-MCNC: 13.7 G/DL (ref 12–16)
HGB UR QL STRIP.AUTO: ABNORMAL
HYALINE CASTS #/AREA URNS AUTO: 0 /LPF (ref 0–8)
INR PPP: 1.15 (ref 0.85–1.15)
KETONES UR STRIP.AUTO-MCNC: NEGATIVE MG/DL
LDLC SERPL CALC-MCNC: 51 MG/DL
LEUKOCYTE ESTERASE UR QL STRIP.AUTO: ABNORMAL
MCH RBC QN AUTO: 29.3 PG (ref 26–34)
MCHC RBC AUTO-ENTMCNC: 34 G/DL (ref 31–36)
MCV RBC AUTO: 86.1 FL (ref 80–100)
NITRITE UR QL STRIP.AUTO: POSITIVE
PH UR STRIP.AUTO: 7.5 [PH] (ref 5–8)
PLATELET # BLD AUTO: 185 K/UL (ref 135–450)
PMV BLD AUTO: 7 FL (ref 5–10.5)
POTASSIUM SERPL-SCNC: 3.7 MMOL/L (ref 3.5–5.1)
POTASSIUM SERPL-SCNC: 4.1 MMOL/L (ref 3.5–5.1)
PROT SERPL-MCNC: 6.5 G/DL (ref 6.4–8.2)
PROT UR STRIP.AUTO-MCNC: NEGATIVE MG/DL
PROTHROMBIN TIME: 15 SEC (ref 11.9–14.9)
RBC # BLD AUTO: 4.69 M/UL (ref 4–5.2)
RBC CLUMPS #/AREA URNS AUTO: 2 /HPF (ref 0–4)
SODIUM SERPL-SCNC: 133 MMOL/L (ref 136–145)
SODIUM SERPL-SCNC: 137 MMOL/L (ref 136–145)
SP GR UR STRIP.AUTO: <=1.005 (ref 1–1.03)
TRIGL SERPL-MCNC: 101 MG/DL (ref 0–150)
TROPONIN, HIGH SENSITIVITY: 8 NG/L (ref 0–14)
TROPONIN, HIGH SENSITIVITY: 9 NG/L (ref 0–14)
UA COMPLETE W REFLEX CULTURE PNL UR: YES
UA DIPSTICK W REFLEX MICRO PNL UR: YES
URN SPEC COLLECT METH UR: ABNORMAL
UROBILINOGEN UR STRIP-ACNC: 0.2 E.U./DL
VLDLC SERPL CALC-MCNC: 20 MG/DL
WBC # BLD AUTO: 6.9 K/UL (ref 4–11)
WBC #/AREA URNS AUTO: 276 /HPF (ref 0–5)

## 2025-01-07 PROCEDURE — 87086 URINE CULTURE/COLONY COUNT: CPT

## 2025-01-07 PROCEDURE — 36415 COLL VENOUS BLD VENIPUNCTURE: CPT

## 2025-01-07 PROCEDURE — 85610 PROTHROMBIN TIME: CPT

## 2025-01-07 PROCEDURE — 87077 CULTURE AEROBIC IDENTIFY: CPT

## 2025-01-07 PROCEDURE — 6370000000 HC RX 637 (ALT 250 FOR IP): Performed by: PHYSICIAN ASSISTANT

## 2025-01-07 PROCEDURE — G0378 HOSPITAL OBSERVATION PER HR: HCPCS

## 2025-01-07 PROCEDURE — 2500000003 HC RX 250 WO HCPCS: Performed by: EMERGENCY MEDICINE

## 2025-01-07 PROCEDURE — 94640 AIRWAY INHALATION TREATMENT: CPT

## 2025-01-07 PROCEDURE — 84484 ASSAY OF TROPONIN QUANT: CPT

## 2025-01-07 PROCEDURE — 81001 URINALYSIS AUTO W/SCOPE: CPT

## 2025-01-07 PROCEDURE — 2500000003 HC RX 250 WO HCPCS: Performed by: PHYSICIAN ASSISTANT

## 2025-01-07 PROCEDURE — 94760 N-INVAS EAR/PLS OXIMETRY 1: CPT

## 2025-01-07 PROCEDURE — 99222 1ST HOSP IP/OBS MODERATE 55: CPT | Performed by: INTERNAL MEDICINE

## 2025-01-07 PROCEDURE — 6360000002 HC RX W HCPCS: Performed by: EMERGENCY MEDICINE

## 2025-01-07 PROCEDURE — 96365 THER/PROPH/DIAG IV INF INIT: CPT

## 2025-01-07 PROCEDURE — 96366 THER/PROPH/DIAG IV INF ADDON: CPT

## 2025-01-07 PROCEDURE — 2580000003 HC RX 258: Performed by: PHYSICIAN ASSISTANT

## 2025-01-07 PROCEDURE — 85027 COMPLETE CBC AUTOMATED: CPT

## 2025-01-07 PROCEDURE — 80048 BASIC METABOLIC PNL TOTAL CA: CPT

## 2025-01-07 PROCEDURE — 93010 ELECTROCARDIOGRAM REPORT: CPT | Performed by: INTERNAL MEDICINE

## 2025-01-07 PROCEDURE — 87186 SC STD MICRODIL/AGAR DIL: CPT

## 2025-01-07 PROCEDURE — 96375 TX/PRO/DX INJ NEW DRUG ADDON: CPT

## 2025-01-07 PROCEDURE — 6360000002 HC RX W HCPCS: Performed by: PHYSICIAN ASSISTANT

## 2025-01-07 PROCEDURE — 80061 LIPID PANEL: CPT

## 2025-01-07 PROCEDURE — 85730 THROMBOPLASTIN TIME PARTIAL: CPT

## 2025-01-07 RX ORDER — LACTOBACILLUS RHAMNOSUS GG 10B CELL
1 CAPSULE ORAL 2 TIMES DAILY WITH MEALS
Status: DISCONTINUED | OUTPATIENT
Start: 2025-01-07 | End: 2025-01-07 | Stop reason: HOSPADM

## 2025-01-07 RX ORDER — SODIUM CHLORIDE 0.9 % (FLUSH) 0.9 %
10 SYRINGE (ML) INJECTION EVERY 12 HOURS SCHEDULED
Status: DISCONTINUED | OUTPATIENT
Start: 2025-01-07 | End: 2025-01-07 | Stop reason: HOSPADM

## 2025-01-07 RX ORDER — FUROSEMIDE 20 MG/1
10 TABLET ORAL DAILY
COMMUNITY
Start: 2024-12-27

## 2025-01-07 RX ORDER — SODIUM CHLORIDE 0.9 % (FLUSH) 0.9 %
10 SYRINGE (ML) INJECTION PRN
Status: DISCONTINUED | OUTPATIENT
Start: 2025-01-07 | End: 2025-01-07 | Stop reason: HOSPADM

## 2025-01-07 RX ORDER — SODIUM CHLORIDE FOR INHALATION 3.5 %
1 VIAL, NEBULIZER (ML) INHALATION 2 TIMES DAILY
COMMUNITY

## 2025-01-07 RX ORDER — FUROSEMIDE 20 MG/1
10 TABLET ORAL DAILY
Status: DISCONTINUED | OUTPATIENT
Start: 2025-01-07 | End: 2025-01-07 | Stop reason: HOSPADM

## 2025-01-07 RX ORDER — DIGOXIN 125 MCG
125 TABLET ORAL DAILY
Status: DISCONTINUED | OUTPATIENT
Start: 2025-01-07 | End: 2025-01-07 | Stop reason: HOSPADM

## 2025-01-07 RX ORDER — APIXABAN 2.5 MG/1
2.5 TABLET, FILM COATED ORAL 2 TIMES DAILY
COMMUNITY

## 2025-01-07 RX ORDER — METOPROLOL TARTRATE 25 MG/1
25 TABLET, FILM COATED ORAL 2 TIMES DAILY
COMMUNITY

## 2025-01-07 RX ORDER — FLUTICASONE FUROATE AND VILANTEROL TRIFENATATE 200; 25 UG/1; UG/1
1 POWDER RESPIRATORY (INHALATION) DAILY
COMMUNITY
Start: 2024-11-12

## 2025-01-07 RX ORDER — ACETAMINOPHEN 325 MG/1
650 TABLET ORAL EVERY 6 HOURS PRN
Status: DISCONTINUED | OUTPATIENT
Start: 2025-01-07 | End: 2025-01-07 | Stop reason: HOSPADM

## 2025-01-07 RX ORDER — ATORVASTATIN CALCIUM 80 MG/1
40 TABLET, FILM COATED ORAL DAILY
Status: DISCONTINUED | OUTPATIENT
Start: 2025-01-07 | End: 2025-01-07 | Stop reason: HOSPADM

## 2025-01-07 RX ORDER — ONDANSETRON 2 MG/ML
4 INJECTION INTRAMUSCULAR; INTRAVENOUS EVERY 6 HOURS PRN
Status: DISCONTINUED | OUTPATIENT
Start: 2025-01-07 | End: 2025-01-07 | Stop reason: HOSPADM

## 2025-01-07 RX ORDER — METOPROLOL TARTRATE 25 MG/1
25 TABLET, FILM COATED ORAL 2 TIMES DAILY
Status: DISCONTINUED | OUTPATIENT
Start: 2025-01-07 | End: 2025-01-07

## 2025-01-07 RX ORDER — ASPIRIN 81 MG/1
81 TABLET, CHEWABLE ORAL DAILY
Status: DISCONTINUED | OUTPATIENT
Start: 2025-01-07 | End: 2025-01-07 | Stop reason: HOSPADM

## 2025-01-07 RX ORDER — SODIUM CHLORIDE 9 MG/ML
INJECTION, SOLUTION INTRAVENOUS PRN
Status: DISCONTINUED | OUTPATIENT
Start: 2025-01-07 | End: 2025-01-07 | Stop reason: HOSPADM

## 2025-01-07 RX ORDER — SENNOSIDES A AND B 8.6 MG/1
1 TABLET, FILM COATED ORAL DAILY PRN
Status: DISCONTINUED | OUTPATIENT
Start: 2025-01-07 | End: 2025-01-07 | Stop reason: HOSPADM

## 2025-01-07 RX ORDER — DIGOXIN 125 MCG
125 TABLET ORAL DAILY
COMMUNITY
Start: 2024-09-06

## 2025-01-07 RX ORDER — POTASSIUM CHLORIDE 7.45 MG/ML
10 INJECTION INTRAVENOUS PRN
Status: DISCONTINUED | OUTPATIENT
Start: 2025-01-07 | End: 2025-01-07 | Stop reason: HOSPADM

## 2025-01-07 RX ORDER — SODIUM CHLORIDE FOR INHALATION 3 %
4 VIAL, NEBULIZER (ML) INHALATION 2 TIMES DAILY
Status: DISCONTINUED | OUTPATIENT
Start: 2025-01-07 | End: 2025-01-07 | Stop reason: HOSPADM

## 2025-01-07 RX ORDER — NITROGLYCERIN 0.4 MG/1
0.4 TABLET SUBLINGUAL EVERY 5 MIN PRN
Status: DISCONTINUED | OUTPATIENT
Start: 2025-01-07 | End: 2025-01-07 | Stop reason: HOSPADM

## 2025-01-07 RX ORDER — MAGNESIUM SULFATE IN WATER 40 MG/ML
2000 INJECTION, SOLUTION INTRAVENOUS PRN
Status: DISCONTINUED | OUTPATIENT
Start: 2025-01-07 | End: 2025-01-07 | Stop reason: HOSPADM

## 2025-01-07 RX ORDER — METOPROLOL TARTRATE 25 MG/1
12.5 TABLET, FILM COATED ORAL 2 TIMES DAILY
Status: DISCONTINUED | OUTPATIENT
Start: 2025-01-07 | End: 2025-01-07 | Stop reason: HOSPADM

## 2025-01-07 RX ORDER — POTASSIUM CHLORIDE 1500 MG/1
40 TABLET, EXTENDED RELEASE ORAL PRN
Status: DISCONTINUED | OUTPATIENT
Start: 2025-01-07 | End: 2025-01-07 | Stop reason: HOSPADM

## 2025-01-07 RX ORDER — BUDESONIDE AND FORMOTEROL FUMARATE DIHYDRATE 160; 4.5 UG/1; UG/1
2 AEROSOL RESPIRATORY (INHALATION)
Status: DISCONTINUED | OUTPATIENT
Start: 2025-01-07 | End: 2025-01-07 | Stop reason: HOSPADM

## 2025-01-07 RX ORDER — ACETAMINOPHEN 650 MG/1
650 SUPPOSITORY RECTAL EVERY 6 HOURS PRN
Status: DISCONTINUED | OUTPATIENT
Start: 2025-01-07 | End: 2025-01-07 | Stop reason: HOSPADM

## 2025-01-07 RX ADMIN — DIGOXIN 125 MCG: 125 TABLET ORAL at 09:18

## 2025-01-07 RX ADMIN — Medication 10 ML: at 09:17

## 2025-01-07 RX ADMIN — WATER 1000 MG: 1 INJECTION INTRAMUSCULAR; INTRAVENOUS; SUBCUTANEOUS at 02:08

## 2025-01-07 RX ADMIN — ASPIRIN 81 MG: 81 TABLET, CHEWABLE ORAL at 09:18

## 2025-01-07 RX ADMIN — FUROSEMIDE 10 MG: 20 TABLET ORAL at 09:18

## 2025-01-07 RX ADMIN — Medication 2 PUFF: at 12:49

## 2025-01-07 RX ADMIN — ATORVASTATIN CALCIUM 40 MG: 80 TABLET, FILM COATED ORAL at 09:18

## 2025-01-07 RX ADMIN — CEFEPIME 2000 MG: 2 INJECTION, POWDER, FOR SOLUTION INTRAVENOUS at 06:45

## 2025-01-07 RX ADMIN — Medication 1 CAPSULE: at 09:18

## 2025-01-07 RX ADMIN — APIXABAN 2.5 MG: 5 TABLET, FILM COATED ORAL at 09:18

## 2025-01-07 ASSESSMENT — PAIN SCALES - GENERAL
PAINLEVEL_OUTOF10: 0
PAINLEVEL_OUTOF10: 2
PAINLEVEL_OUTOF10: 0

## 2025-01-07 ASSESSMENT — PAIN DESCRIPTION - LOCATION: LOCATION: ARM

## 2025-01-07 ASSESSMENT — HEART SCORE: ECG: NON-SPECIFC REPOLARIZATION DISTURBANCE/LBTB/PM

## 2025-01-07 ASSESSMENT — PAIN DESCRIPTION - ORIENTATION: ORIENTATION: LEFT

## 2025-01-07 NOTE — FLOWSHEET NOTE
01/07/25 0915   Vital Signs   Temp 97.3 °F (36.3 °C)   Temp Source Oral   Pulse 63   Heart Rate Source Monitor   BP (!) 161/90   MAP (Calculated) 114   Pain Assessment   Pain Assessment 0-10   Pain Level 0   Oxygen Therapy   SpO2 93 %   O2 Device None (Room air)   Rhythm Interpretation   Cardiac Rhythm Atrial fib       Lab Results   Component Value Date/Time    WBC 6.9 01/07/2025 05:35 AM    HGB 13.7 01/07/2025 05:35 AM    HCT 40.4 01/07/2025 05:35 AM     01/07/2025 05:35 AM     01/07/2025 05:35 AM    K 4.1 01/07/2025 05:35 AM    BUN 10 01/07/2025 05:35 AM    CREATININE 0.8 01/07/2025 05:35 AM    MG 1.70 (L) 05/10/2020 05:10 AM        AM Assessment completed.  Patient in bed.  Awake, Alert and oriented. Respirations easy unlabored. Wants to eat.  Urology at bedside.  Plan of care, education and safety measures reviewed and mutually agreed upon with the patient.   Calls appropriately. Needed items including call light in reach and exit alarm in place.

## 2025-01-07 NOTE — PROGRESS NOTES
01/07/25 0505   RT Protocol   History Pulmonary Disease 2   Respiratory pattern 0   Breath sounds 2   Cough 0   Bronchodilator Assessment Score 4        rpt ct noted sbo, pt w/ nv, unclear exact etiology of obstruction, surg consulted for admission and further management

## 2025-01-07 NOTE — CONSULTS
[facility]  H+P  Consult  OP Visit  FU Visit   CC/HPI   CC New patient visit for left arm pain.   HPI 89 y.o., female admitted with left arm pain.  Pain started in elbow and radiating to hand.  No cp, sob, diaphoresis.  Occurred after sleeping on left arm.  Pain resolved and feels fine.  Reports no similar pain in past.  Follows with Jay.   CARDIAC TESTING   EKG AFIB,   Troponin Lab Results   Component Value Date    TROPHS 9 01/07/2025    TROPHS 8 01/06/2025      HISTORY/ALLERGY/ROS   MEDHx  has a past medical history of COPD (chronic obstructive pulmonary disease) (HCC) and PNA (pneumonia).   SURGHx  has no past surgical history on file.   SOCHx  reports that she has never smoked. She has never used smokeless tobacco. She reports that she does not drink alcohol and does not use drugs.   FAMHx family history is not on file.   ALLERG Sulfamethoxazole-trimethoprim, Albuterol sulfate, Alendronate, Codeine, Fluticasone, Morphine and codeine, Nitrofurantoin, Promethazine, Sodium chloride, Sulfa antibiotics, and Vitamin d analogs   ROS Full ROS obtained and negative except as mentioned in HPI   MEDICATIONS   Medications reviewed in Epic.   PHYSICAL EXAM   Vitals BP (!) 157/91   Pulse 87   Temp 98.1 °F (36.7 °C) (Oral)   Resp 16   Ht 1.499 m (4' 11\")   Wt 49.4 kg (108 lb 14.5 oz)   SpO2 95%   BMI 22.00 kg/m²    Gen Alert, coop, no distress Heart  Irreg Irreg, 1/6   Head NC, AT, no abnorm Abd  Soft, NT, +BS, no mass, no OM   Eyes PER, conj/corn clear Ext  Ext nl, AT, no C/C/E   Nose Nares nl, no drain, NT Pulse 2+ and symmetric   Throat Lips, mucosa, tongue nl Skin Col/text/turg nl, no vis rash/les   Neck S/S, TM, NT, no bruit/JVD Psych Nl mood and affect   Lung CTA-B, unlabored, no DTP Lymph   No cervical or axillary LA   Ch wall NT, no deform Neuro  Nl gross M/S exam      ASSESSMENT TIME   N/A   ASSESSMENT AND PLAN   *CAD  Status Remote POBA  TTE 5/20 55%   9/23 55%, mod-sev Mac GONZALEZ 52cm bio 
radiograph HISTORY: ORDERING SYSTEM PROVIDED HISTORY: Chest Pain TECHNOLOGIST PROVIDED HISTORY: Reason for exam:->Chest Pain Reason for Exam: Chest Pain and Arm Pain FINDINGS: Cardiomegaly with interval placement of a left-sided pacemaker.  Mediastinum is normal.  Pulmonary vascular markings are normal.  COPD with chronic interstitial changes.  No acute airspace abnormality.  No significant skeletal finding.     Cardiomegaly with no acute finding.            Electronically signed by: Mack Yusuf PA-C, 1/7/2025   [facility]  Office contact: [phone]

## 2025-01-07 NOTE — H&P
Hospital Medicine History & Physical        Name:  Claudette L Denoma /Age/Sex: 8/15/1933  (89 y.o. female)   MRN & CSN:  [id number] & [id number] Encounter Date/Time: 2025 3:29 AM EST   Location:  ED- PCP: Melissa Jaramillo MD         CHIEF COMPLAINT:   Chief Complaint   Patient presents with    Arm Pain     Patient started having arm since 5pm and has progressively gotten worse and was told to come to hospital; patient took 3 nitro prior to EMS arrival. [facility]         HISTORY OF PRESENT ILLNESS:      History from: patient  Limitations to history : None     Claudette L Denoma is a 89 y.o. female who presented to the ED for evaluation of left arm pain radiating to her chest.  She reports she noticed it when she woke up from a nap this evening around 5 PM.  She reports she took 3 nitros which did not seem to do much.  She reports pain does slightly radiate to her chest.  She thinks she may have just slept on her arm weird but has never had pain like this and wanted to make sure it was not related to her heart.  She does have a history of CAD and A fib.  She reports chronic shortness of breath which is unchanged.  She denies fever, cough, edema, GI symptoms, urinary symptoms.  She denies any other complaints or concerns at this time.  Patient found to have a UTI while in ED.  She requested a consult with urology since she just had a UTI in November and was admitted to .      REVIEW OF SYSTEMS:   Pertinent positives as noted in the HPI. All other systems reviewed and negative.      PHYSICAL EXAM PERFORMED:  /74   Pulse 87   Temp 97.6 °F (36.4 °C) (Oral)   Resp 16   Ht 1.499 m (4' 11\")   Wt 56.7 kg (125 lb)   SpO2 91%   BMI 25.25 kg/m²     General appearance:  Awake, alert, no apparent distress  HEENT:  Normocephalic, atraumatic without obvious deformity. PERRL. EOM intact. Conjunctivae/corneas clear.  Neck:  Supple, with full range of motion. No JVD.

## 2025-01-07 NOTE — RT PROTOCOL NOTE
every 4 hours PRN for wheezing or increased work of breathing using Per Protocol order mode.        4-6 - enter or revise RT Bronchodilator order(s) to two equivalent RT bronchodilator orders with one order with BID Frequency and one order with Frequency of every 4 hours PRN wheezing or increased work of breathing using Per Protocol order mode.        7-10 - enter or revise RT Bronchodilator order(s) to two equivalent RT bronchodilator orders with one order with TID Frequency and one order with Frequency of every 4 hours PRN wheezing or increased work of breathing using Per Protocol order mode.       11-13 - enter or revise RT Bronchodilator order(s) to one equivalent RT bronchodilator order with QID Frequency and an Albuterol order with Frequency of every 4 hours PRN wheezing or increased work of breathing using Per Protocol order mode.      Greater than 13 - enter or revise RT Bronchodilator order(s) to one equivalent RT bronchodilator order with every 4 hours Frequency and an Albuterol order with Frequency of every 2 hours PRN wheezing or increased work of breathing using Per Protocol order mode.     RT to enter RT Home Evaluation for COPD & MDI Assessment order using Per Protocol order mode.    Electronically signed by Eduardo Eagle RCP on 1/7/2025 at 5:05 AM

## 2025-01-07 NOTE — PROGRESS NOTES
Nursing Discharge Note    Verbal and Written discharge instructions was given to the patient including diet, activity and medications.     No medication changes.     At the time of discharge, patient reported feeling stable. The patient expressed appropriate understanding of, and agreement with, the discharge recommendations, medications, and follow up appointments and understands to seek medical attention if they experience recurrence or worsening of symptoms.       D/C'd IV patient tolerated well, no signs of bleeding.The written instructions was given to the patient to take home. Pt discharged home with all belongings. Out via wheelchair. Home via LYFT.

## 2025-01-07 NOTE — FLOWSHEET NOTE
Pt brought from ed via stretcher. Pt ambulated to br with stand by assist. Pt oriented to room and use of call light. Bed alarm armed. POC discussed with patient and all questions answered.

## 2025-01-07 NOTE — PROGRESS NOTES
Patient was admitted this morning with chest and left upper extremity discomfort and numbness    Subjective.  Chest pain has improved, does have mild left hand discomfort and numbness    Physical exam  Cardiology.  irRegular rhythm normal rate no edema    Chest pain. Hx of CAD s/p PCI   No sign of acute coronary syndrome, but given cardiac history, may need further evaluation, cardiology consulted    A-fib.  Rate controlled, continue home medication    Positive UA  Patient was symptomatic so this could be asymptomatic bacteriuria  Antibiotic started on admission, continue until final culture

## 2025-01-07 NOTE — PLAN OF CARE
Problem: Chronic Conditions and Co-morbidities  Goal: Patient's chronic conditions and co-morbidity symptoms are monitored and maintained or improved  Outcome: Progressing     Problem: Discharge Planning  Goal: Discharge to home or other facility with appropriate resources  Outcome: Progressing     Problem: Pain  Goal: Verbalizes/displays adequate comfort level or baseline comfort level  Outcome: Progressing     Problem: ABCDS Injury Assessment  Goal: Absence of physical injury  Outcome: Progressing     Problem: Infection - Adult  Goal: Absence of infection at discharge  Outcome: Progressing

## 2025-01-07 NOTE — ED PROVIDER NOTES
Range    Troponin, High Sensitivity 9 0 - 14 ng/L   Urinalysis with Reflex to Culture    Specimen: Urine   Result Value Ref Range    Color, UA Yellow Straw/Yellow    Clarity, UA CLOUDY (A) Clear    Glucose, Ur Negative Negative mg/dL    Bilirubin, Urine Negative Negative    Ketones, Urine Negative Negative mg/dL    Specific Gravity, UA <=1.005 1.005 - 1.030    Blood, Urine TRACE (A) Negative    pH, Urine 7.5 5.0 - 8.0    Protein, UA Negative Negative mg/dL    Urobilinogen, Urine 0.2 <2.0 E.U./dL    Nitrite, Urine POSITIVE (A) Negative    Leukocyte Esterase, Urine LARGE (A) Negative    Microscopic Examination YES     Urine Type NotGiven     Urine Reflex to Culture Yes    Microscopic Urinalysis   Result Value Ref Range    Bacteria, UA 4+ (A) None Seen /HPF    Hyaline Casts, UA 0 0 - 8 /LPF    WBC,  (H) 0 - 5 /HPF    RBC, UA 2 0 - 4 /HPF    Epithelial Cells, UA 0 0 - 5 /HPF   EKG 12 Lead   Result Value Ref Range    Ventricular Rate 84 BPM    QRS Duration 94 ms    Q-T Interval 390 ms    QTc Calculation (Bazett) 460 ms    R Axis 51 degrees    T Axis 48 degrees    Diagnosis       Atrial fibrillationStatement not found (#710) Statement not found (#713)Abnormal ECG       RADIOLOGY  Any applicable radiology studies including x-ray, CT, MRI, and/or ultrasound, were reviewed independently by me in addition to the radiologist.  I reviewed all radiology images and reports as well from this evaluation.  XR CHEST PORTABLE   Final Result   Cardiomegaly with no acute finding.               ED COURSE/MDM  Old records reviewed. Labs and imaging reviewed.  Patient seen and evaluated by myself.  Patient presents for evaluation of left arm pain slowly going into the chest.  Noticed it when she woke from a nap.  Did take nitroglycerin but states really did not do much.  Does have cardiac history including A-fib.  She is anticoagulated.  Her labs returned fairly unremarkable.  Her EKG shows her baseline A-fib.  Her troponins are

## 2025-01-07 NOTE — ED NOTES
Patient Name: Claudette L Denoma  : 8/15/1933 89 y.o.  MRN: [id number]  ED Room #: ED-0012/12     Chief complaint:   Chief Complaint   Patient presents with    Arm Pain     Patient started having arm since 5pm and has progressively gotten worse and was told to come to hospital; patient took 3 nitro prior to EMS arrival. [facility] Problem/Diagnosis:   Hospital Problems             Last Modified POA    * (Principal) Chest pain 2025 Yes         O2 Flow Rate:    (if applicable)  Cardiac Rhythm:   (if applicable)  Active LDA's:   Peripheral IV 20 Right Antecubital (Active)       Peripheral IV 25 Posterior;Right Forearm (Active)            How does patient ambulate? Stand by assist    2. How does patient take pills? Whole with Water    3. Is patient alert? Alert    4. Is patient oriented? To Person, To Place, To Time, To Situation, and Follows Commands    5.   Patient arrived from:  home  Facility Name: ___________________________________________    6. If patient is disoriented or from a Skill Nursing Facility has family been notified of admission? No    7. Patient belongings? Belongings: Cell Phone and Wallet    Disposition of belongings? No Belongings     8. Any specific patient or family belongings/needs/dynamics?   a.     9. Miscellaneous comments/pending orders?  a.      If there are any additional questions please reach out to the Emergency Department.

## 2025-01-08 NOTE — DISCHARGE SUMMARY
Hospitalist Discharge Summary     Claudette L Denoma  : 8/15/1933  MRN: [id number]    Admit date: 2025  Discharge date: 2025    Admitting Physician: Sidney Huang MD    Discharge Diagnoses:   Patient Active Problem List   Diagnosis    Acute respiratory failure    Acute on chronic diastolic congestive heart failure (HCC)    Chronic a-fib (HCC)    Chest pain       Admission Condition: fair    Discharged Condition: good    Discharge Exam:  VITALS:  BP (!) 150/87   Pulse 73   Temp 97.4 °F (36.3 °C) (Oral)   Resp 16   Ht 1.499 m (4' 11\")   Wt 49.4 kg (108 lb 14.5 oz)   SpO2 93%   BMI 22.00 kg/m²   CONSTITUTIONAL:  awake, alert, cooperative, no apparent distress, and appears stated age  LUNGS:  clear to auscultation bilaterally, No increased work of breathing, good air exchange, no crackles or wheezing  CARDIOVASCULAR:  Regular rate and rhythm, normal S1 and S2, no S3 or S4, and no significant murmurs, rubs or gallops noted.   NEUROLOGIC:  Awake, alert, oriented to name, place and time.  Cranial nerves II-XII are grossly intact.        Hospital Course:   89 y.o. female who presented to the ED for evaluation of left arm pain radiating to her chest, has h/o CAD and PCI.   Symptoms improved, no sign of ACS.  Chest pain atypical. No  inpatient intervention done.  Pt will fu with her [facility] cardiology    A fib  ;  Continue home digoxin, metoprolol, and eliquis     Asymptomatic bacteriuria  Ur cx strep epi, likely contamination  Chief Complaint   Patient presents with    Arm Pain     Patient started having arm since 5pm and has progressively gotten worse and was told to come to hospital; patient took 3 nitro prior to EMS arrival. [facility]        Core Measures:   Acute Myocardial Infarction,  Heart failure,  CVA    Consults: cardiology    Imaging Studies:  XR CHEST PORTABLE    Result Date: 2025  EXAMINATION: ONE XRAY VIEW OF THE CHEST 2025 11:44 pm COMPARISON: 2020 radiograph

## 2025-01-09 LAB
BACTERIA UR CULT: ABNORMAL
ORGANISM: ABNORMAL